# Patient Record
Sex: FEMALE | Race: WHITE
[De-identification: names, ages, dates, MRNs, and addresses within clinical notes are randomized per-mention and may not be internally consistent; named-entity substitution may affect disease eponyms.]

---

## 2018-10-27 ENCOUNTER — HOSPITAL ENCOUNTER (EMERGENCY)
Dept: HOSPITAL 25 - ED | Age: 83
LOS: 1 days | Discharge: HOME | End: 2018-10-28
Payer: MEDICARE

## 2018-10-27 DIAGNOSIS — R53.1: ICD-10-CM
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
## 2018-10-27 DIAGNOSIS — S70.02XA: Primary | ICD-10-CM
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
## 2018-10-27 LAB
BASOPHILS # BLD: 0 10^3/UL (ref 0–0.2)
HCT VFR BLD AUTO: 26 % (ref 35–47)
HGB BLD-MCNC: 8.9 G/DL (ref 12–16)
MCH RBC QN AUTO: 37 PG (ref 27–31)
MCHC RBC AUTO-ENTMCNC: 35 G/DL (ref 31–36)
MCV RBC AUTO: 106 FL (ref 80–97)
NEUTROPHILS # BLD AUTO: 1.8 10^3/UL (ref 1.5–7.7)
NEUTROPHILS # BLD: 1.5 10^3/UL (ref 1.5–7.7)
PLATELET # BLD AUTO: 104 10^3/UL (ref 150–450)
RBC # BLD AUTO: 2.4 10^6/UL (ref 4–5.4)
WBC # BLD AUTO: 2.1 10^3/UL (ref 3.5–10.8)

## 2018-10-27 PROCEDURE — 87086 URINE CULTURE/COLONY COUNT: CPT

## 2018-10-27 PROCEDURE — 81015 MICROSCOPIC EXAM OF URINE: CPT

## 2018-10-27 PROCEDURE — 81003 URINALYSIS AUTO W/O SCOPE: CPT

## 2018-10-27 PROCEDURE — 72192 CT PELVIS W/O DYE: CPT

## 2018-10-27 PROCEDURE — 99283 EMERGENCY DEPT VISIT LOW MDM: CPT

## 2018-10-27 PROCEDURE — 36415 COLL VENOUS BLD VENIPUNCTURE: CPT

## 2018-10-27 PROCEDURE — 93005 ELECTROCARDIOGRAM TRACING: CPT

## 2018-10-27 PROCEDURE — 85025 COMPLETE CBC W/AUTO DIFF WBC: CPT

## 2018-10-27 PROCEDURE — 80053 COMPREHEN METABOLIC PANEL: CPT

## 2018-10-27 NOTE — XMS REPORT
Milly Saenz

 Created on:2018



Patient:Milly Saenz

Sex:Female

:1933

External Reference #:[id number]





Demographics







 Address  [address] [place] [address]

 

 Home Phone  [phone]

 

 Email Address  [email or web address] [email or web address]

 

 Preferred Language  English

 

 Marital Status  Not  Or 

 

 Gnosticist Affiliation  Unknown

 

 Race  White

 

 Ethnic Group  Not  Or 









Author







 Organization  [facility]

 

 Address  [address]



   [place] [address]

 

 Phone  [phone]









Support







 Name  Relationship  Address  Phone

 

 Vaughn Lizama/Laney  Unavailable  Unavailable  [phone]

 

 Erlin Valdesjoceline  Unavailable  Unavailable  [phone]









Care Team Providers







 Name  Role  Phone

 

 Grey Coello MD  Primary Care Physician  Unavailable









Payers







 Type  Date  Identification Numbers  Payment Provider  Subscriber

 

 Medicare Primary    Policy Number: [id number]  Medicare  Milly Saenz









 PayID: [id number]  [address]









 [place] [address]









 [employer]  Effective: 10/01/1998  Policy Number:  Medicare  Milly Saenz



     [id number]    









 Expires: 09/10/2018  PayID: [id number]  [address]









 [place] [address]









 [employer] [employer]  Effective:  Policy Number:  [facility]  Milly Mckeonmaria del rosario



   2008  [id number]  Healthcare  









 PayID: [id number]  [address]









 [place] [address]







Problems







 Date  Description  Provider  Status

 

 Onset: 2018  Localized, primary osteoarthritis  Rajinder Medina MD  Active

 

 Onset: 2018  Closed fracture of phalanx of foot  Rajinder Medina MD  
Active

 

 Onset: 2018  Closed fracture of metatarsal bone  Rajinder Medina MD  
Active







Social History







 Description

 

 No Information Available







Allergies, Adverse Reactions, Alerts







 Date  Description  Reaction  Status  Severity  Comments

 

 2018  NKDA    active    







Medications







 Medication  Date  Status  Form  Strength  Qnty  SIG  Indications  Ordering



                 Provider

 

 Simvastatin  00/  Active  Tablets  20mg    1 by mouth    Unknown



   0000          every day    

 

 Raloxifene HCL  00/00/  Active  Tablets  60mg    1 by mouth    Unknown



   0000          every day    

 

 Levothyroxine  0000/  Active  Tablets  112mcg    1 by mouth    Unknown



 Sodium  0000          every day    

 

 Calcitonin  00/  Active  Solution  200Unit/Ac    one spray    Unknown



 ([place])  0000      t    daily in    



             alternate    



             nostrils    

 

 Omeprazole  /  Active  Capsules  20mg    1 by mouth    Unknown



   0000    DR      every day    

 

 Imbruvica  /  Active  Capsules  420mg    1 tab po    Unknown



   0000          daily    







Vital Signs







 Date  Vital  Result  Comment

 

 10/23/2018  Height  61 inches  5'1"









 Weight  129.00 lb  

 

 Heart Rate  88 /min  

 

 Respiratory Rate  18 /min  

 

 BMI (Body Mass Index)  24.4 kg/m2  









 2018  Height  61 inches  5'1"









 Weight  129.00 lb  

 

 Heart Rate  84 /min  

 

 Respiratory Rate  16 /min  

 

 Body Temperature  97.7 F  

 

 Pain Level  8  

 

 BMI (Body Mass Index)  24.4 kg/m2  







Results







 Description

 

 No Information







Procedures







 Date  CPT Code  Description  Status  Comment

 

 2013  58756  Rad Exam; Fingers  Completed  

 

 2013  84490  Rad Exam; Wrist, Comp, Min 3 Views  Completed  RT

 

 2013  06414  Xray Knee 3 Views  Completed  

 

 2013  67044  Rad Exam; Knee, Ap&L  Completed  







Encounters







 Type  Date  Location  Provider  CPT E/M  Dx

 

 Office Visit  2018  [facility]  Rajinder Medina MD  [id number]  
S92.325A



   10:00a  Of [facility]      









 S92.335A

 

 S92.345A

 

 S92.415A

 

 M17.12

 

 W19.xxxA

 

 S92.355A









 Office Visit  2013 10:15a  [facility]  Chyna Dash,  
[address]  [phone]



     Of CGerberMLIZA JOHNSON    









 719.44









 Office Visit  2013 10:30a  [facility]  Elver Mehta M.D.  
[address]  [phone]



     C.MGerberAGerber      

 

 Office Visit  2013 10:30a  [facility]  Chyna  [address]  
715.94



     CBARNEY Dash M.D.    

 

 [facility] Visit  2009 12:30a  [facility]  [address]  [phone]



     [facility]  M.D.    









 995.91

 

 284.1







Plan of Care

Future Appointment(s):2018 10:15 am - Rajinder Medina MD at [facility].10/23/2018 - Rajinder Medina, MDS92.335A Nondisp fx of third 
metatarsal bone, left foot, initNew Xrays:Foot Left 3+ VWSFollow up:Follow Up: 
  6 fnolvJ27.325A Nondisp fx of second metatarsal bone, left foot, initNew Xrays
:Foot Left 3+ VWSS92.345A Nondisp fx of fourth metatarsal bone, left foot, init

## 2018-10-27 NOTE — ED
Lower Extremity





- HPI Summary


HPI Summary: 





This patient is a 85 year old F BIBA to [facility] accompanied by her friend with a 

chief complaint of left hip pain after a mechanical fall prior to arrival. 

Patient states she typically uses a walker at baseline, and while entering her 

apartment she used the wall as support instead and fell on her left hip. She 

states that afterward she was able to get up and enter her apartment, but her 

neighbor had called EMS. Patient denies recent fever, cold, cough, difficult 

breathing, changes in appetite and bowel or urinary habits. Patient reports 

gradually worsening leg weakness. Left hip pain is worsened with movement. PMHx 

of chronic lymphatic leukemia.





- History of Current Complaint


Stated Complaint: HIP PAIN


Time Seen by Provider: 10/27/18 20:40


Hx Obtained From: Patient


Mechanism Of Injury: Fall From A Standing Position


Onset of Pain: Immediate, Prior to Arrival


Onset/Duration: Still Present


Timing: Constant


Location: Is Discrete @ - left hip


Aggravating Factor(s): Movement


Able to Bear Weight: Yes





- Allergies/Home Medications


Allergies/Adverse Reactions: 


 Allergies











Allergy/AdvReac Type Severity Reaction Status Date / Time


 


No Known Allergies Allergy   Verified 09/10/18 10:29











Home Medications: 


 Home Medications





Omeprazole 20 mg PO DAILY 10/27/18 [History Confirmed 10/27/18]











PMH/Surg Hx/FS Hx/Imm Hx


Endocrine/Hematology History: Reports: Hx Thyroid Disease


EENT History: 


   Denies: Hx Deafness





- Cancer History


Cancer Type, Location and Year: leukemia


Hx Chemotherapy: Yes - 3 YEARS AGO, LEUKEMIA


Hx Radiation Therapy: Yes - 9 YEARS AGO,  LEUKEMIA





- Family History


Known Family History: 


   Negative: Respiratory Disease, Seizure Disorder





- Social History


Alcohol Use: None


Substance Use Type: Reports: None


Smoking Status (MU): Never Smoked Tobacco





Review of Systems


Negative: Fever


Respiratory: Negative


Gastrointestinal: Negative


Positive: no symptoms reported


Positive: Myalgia - left hip


Neurological: Negative


All Other Systems Reviewed And Are Negative: Yes





Physical Exam





- Summary


Physical Exam Summary: 





Appearance: Well-appearing, Well-nourished, lying in bed comfortably


Skin: Warm, dry, no obvious rash


Eyes: sclera anicteric, no conjunctival pallor


ENT: mucous membranes moist, pharynx appears normal


Neck: Supple, nontender


Respiratory: Clear to auscultation, no signs of respiratory distress


Cardiovascular: Normal S1, S2. No murmurs. Normal distal pulses in tibial and 

radial bilaterally.


Abdomen: Soft, nontender, normal active bowel sounds present


Musculoskeletal: Normal, Strength/ROM Intact, no pain with rotation and flexion 

of the hip, no pain with longitudinal pressure at the foot


Neurological: A&Ox3, awake and alert, mentation is normal, speech is fluent and 

appropriate


Psychiatric: affect is normal, does not appear anxious or depressed





Triage Information Reviewed: Yes


Vital Signs Reviewed: Yes





Diagnostics





- Laboratory


Result Diagrams: 


 10/27/18 20:48





 10/27/18 20:48


Lab Statement: Any lab studies that have been ordered have been reviewed, and 

results considered in the medical decision making process.





- Radiology


  ** L Hip XR


Radiology Interpretation Completed By: ED Physician - Negative.





- CT


  ** Pelvis CT


CT Interpretation Completed By: Radiologist - Diffuse demineralization of the 

bones. Degenerative changes. Mildly depressed  fracture of the right superior 

pubic ramus seen on the sagittal and coronal  images cannot be seen on the 

axial source images. Findings may be secondary to  reconstruction artifact. 

Bowing of the left inferior pubic ramus on the left.  No obvious fracture or 

dislocation is seen. Degenerative changes of the  lumbosacral spine.   ED 

Physician has reviewed this report.





- EKG


  ** 2053


Cardiac Rate: NL - 80 BPM


EKG Rhythm: Sinus Rhythm


Summary of EKG Findings: normal EKG





Re-Evaluation





- Re-Evaluation


  ** 0145


Re-Evaluation Time: 01:45


Comment: Patient is able to bear weight; however, patient is limitedin ability 

to ambulate. Patient wont be able to get into home due to darkness and weather. 

Will medicate for pain and will re-eval in the morning.





Lower Extremity Course/Dx





- Course


Course Of Treatment: 85 year old F BIBA to [facility] accompanied by her friend with 

a chief complaint of left hip pain after a mechanical fall prior to arrival. 

Patient states she typically uses a walker at baseline, and while entering her 

apartment she used the wall as support instead and fell on her left hip. She 

states that afterward she was able to get up and enter her apartment. Bloodwork 

is consistent with reported leukemia. Patient is given Motrin, Flexeril, and 

Tylenol. Left hip XR is negative for fracture. Pelvis CT reveals degenerative 

changes, but no acute fracture. Patient is able to bear weight; however, 

patient is limited in ability to ambulate. Patient wont be able to get into 

home due to darkness and weather. after sleeping through the night patient is 

able to ambulate well and is discharged home.





Discharge





- Sign-Out/Discharge


Documenting (check all that apply): Patient Departure - discharge





- Discharge Plan


Condition: Improved


Disposition: HOME


Patient Education Materials:  Hip Contusion (ED)


Referrals: 


Grey Coello MD [Primary Care Provider] - 3 Days (if not better)


Additional Instructions: 


Take tylenol, 2 extra strenth tabs, 4 times daily for pain. You can supplement 

that with OTC motrin, 400 mg three times daily if needed. Ice over the painful 

areas can also help with the pain.





- Attestation Statements


Document Initiated by Scribe: Yes


Documenting Scribe: Madelin Herrera 


Provider For Whom Scribe is Documenting (Include Credential): Venu Gilmore MD


Scribe Attestation: 


Madelin CASTILLO , scribed for Venu Gilmore MD on 10/28/18 at 0649.

## 2018-10-28 VITALS — SYSTOLIC BLOOD PRESSURE: 120 MMHG | DIASTOLIC BLOOD PRESSURE: 74 MMHG

## 2018-10-28 LAB — WBC UR QL AUTO: (no result)

## 2018-10-28 NOTE — RAD
INDICATION:  Left hip injury.



COMPARISON:  There are no relevant prior studies available for comparison.



TECHNIQUE: An AP view of the pelvis and frontal and lateral views of the left hip were

obtained.



FINDINGS: The bones appear osteopenic. The hips appear dysplastic. No fracture is seen.  



There is mild bilateral osteoarthritic change in the hips.



IMPRESSION: LIMITED STUDY, NO EVIDENCE FOR FRACTURE, IF THE PATIENT'S SYMPTOMS PERSIST

RECOMMEND FOLLOW-UP IMAGING.



R0

## 2018-10-28 NOTE — RAD
EXAM: 

 CT Pelvis Without Intravenous Contrast, Skeletal 



EXAM DATE/TIME: 

 10/27/2018 11:55 PM 



CLINICAL HISTORY: 

 85 years old, female; Injury or trauma; Fall; Initial encounter; Abrasion; 

Left; Groin; Additional info: Fall, left hip pain, neg plain film, R/O hip FX 



TECHNIQUE: 

 Axial computed tomography images of the pelvis without intravenous contrast. 

Exam focused on the skeletal structures. 

 All CT scans at this facility use at least one of these dose optimization 

techniques: automated exposure control; mA and/or kV adjustment per patient 

size (includes targeted exams where dose is matched to clinical indication); or 

iterative reconstruction. 

 Coronal and sagittal reformatted images were created and reviewed. 



COMPARISON: 

 No relevant prior studies available. 



FINDINGS: 

 Vasculature:  Atherosclerosis. 

 Bones/joints:  Diffuse demineralization of the bones. Degenerative changes. 

Mildly depressed fracture of the right superior pubic ramus seen on the 

sagittal and coronal images cannot be seen on the axial source images. Findings 

may be secondary to reconstruction artifact. Bowing of the left inferior pubic 

ramus on the left. No obvious fracture or dislocation is seen. Degenerative 

changes of the lumbosacral spine. 

 Soft tissues: Unremarkable. 



IMPRESSION: 

Diffuse demineralization of the bones. Degenerative changes. Mildly depressed 

fracture of the right superior pubic ramus seen on the sagittal and coronal 

images cannot be seen on the axial source images. Findings may be secondary to 

reconstruction artifact. Bowing of the left inferior pubic ramus on the left. 

No obvious fracture or dislocation is seen. Degenerative changes of the 

lumbosacral spine. 



To contact [facility] with a general question: [facility] - [phone]

For direct physician to physician contact: Physician Hotline - [phone]

[facility] ([facility] Facility ID #[id number])

## 2018-11-01 ENCOUNTER — HOSPITAL ENCOUNTER (INPATIENT)
Dept: HOSPITAL 25 - ED | Age: 83
LOS: 5 days | Discharge: NURSING, REHAB OR LONG-TERM CARE | DRG: 809 | End: 2018-11-06
Attending: INTERNAL MEDICINE | Admitting: INTERNAL MEDICINE
Payer: MEDICARE

## 2018-11-01 DIAGNOSIS — Z86.12: ICD-10-CM

## 2018-11-01 DIAGNOSIS — M81.0: ICD-10-CM

## 2018-11-01 DIAGNOSIS — Z79.899: ICD-10-CM

## 2018-11-01 DIAGNOSIS — E78.5: ICD-10-CM

## 2018-11-01 DIAGNOSIS — D61.810: Primary | ICD-10-CM

## 2018-11-01 DIAGNOSIS — E87.1: ICD-10-CM

## 2018-11-01 DIAGNOSIS — C91.10: ICD-10-CM

## 2018-11-01 DIAGNOSIS — E03.9: ICD-10-CM

## 2018-11-01 DIAGNOSIS — E44.0: ICD-10-CM

## 2018-11-01 DIAGNOSIS — G83.11: ICD-10-CM

## 2018-11-01 DIAGNOSIS — R53.1: ICD-10-CM

## 2018-11-01 LAB
BASOPHILS # BLD AUTO: 0 10^3/UL (ref 0–0.2)
EOSINOPHIL # BLD AUTO: 0.1 10^3/UL (ref 0–0.6)
HCT VFR BLD AUTO: 21 % (ref 35–47)
HGB BLD-MCNC: 7.5 G/DL (ref 12–16)
LYMPHOCYTES # BLD AUTO: 0.4 10^3/UL (ref 1–4.8)
MCH RBC QN AUTO: 38 PG (ref 27–31)
MCHC RBC AUTO-ENTMCNC: 36 G/DL (ref 31–36)
MCV RBC AUTO: 106 FL (ref 80–97)
MONOCYTES # BLD AUTO: 0.1 10^3/UL (ref 0–0.8)
NEUTROPHILS # BLD AUTO: 2.2 10^3/UL (ref 1.5–7.7)
NRBC # BLD AUTO: 0 10^3/UL
NRBC BLD QL AUTO: 0.4
PLATELET # BLD AUTO: 56 10^3/UL (ref 150–450)
RBC # BLD AUTO: 2 10^6/UL (ref 4–5.4)
WBC # BLD AUTO: 2.7 10^3/UL (ref 3.5–10.8)

## 2018-11-01 PROCEDURE — 84550 ASSAY OF BLOOD/URIC ACID: CPT

## 2018-11-01 PROCEDURE — 81015 MICROSCOPIC EXAM OF URINE: CPT

## 2018-11-01 PROCEDURE — 99223 1ST HOSP IP/OBS HIGH 75: CPT

## 2018-11-01 PROCEDURE — 83735 ASSAY OF MAGNESIUM: CPT

## 2018-11-01 PROCEDURE — 86850 RBC ANTIBODY SCREEN: CPT

## 2018-11-01 PROCEDURE — 86901 BLOOD TYPING SEROLOGIC RH(D): CPT

## 2018-11-01 PROCEDURE — 82570 ASSAY OF URINE CREATININE: CPT

## 2018-11-01 PROCEDURE — 83935 ASSAY OF URINE OSMOLALITY: CPT

## 2018-11-01 PROCEDURE — 83550 IRON BINDING TEST: CPT

## 2018-11-01 PROCEDURE — 84484 ASSAY OF TROPONIN QUANT: CPT

## 2018-11-01 PROCEDURE — 83605 ASSAY OF LACTIC ACID: CPT

## 2018-11-01 PROCEDURE — 30233N1 TRANSFUSION OF NONAUTOLOGOUS RED BLOOD CELLS INTO PERIPHERAL VEIN, PERCUTANEOUS APPROACH: ICD-10-PCS | Performed by: INTERNAL MEDICINE

## 2018-11-01 PROCEDURE — 84443 ASSAY THYROID STIM HORMONE: CPT

## 2018-11-01 PROCEDURE — 36415 COLL VENOUS BLD VENIPUNCTURE: CPT

## 2018-11-01 PROCEDURE — P9040 RBC LEUKOREDUCED IRRADIATED: HCPCS

## 2018-11-01 PROCEDURE — 82272 OCCULT BLD FECES 1-3 TESTS: CPT

## 2018-11-01 PROCEDURE — 93005 ELECTROCARDIOGRAM TRACING: CPT

## 2018-11-01 PROCEDURE — 80053 COMPREHEN METABOLIC PANEL: CPT

## 2018-11-01 PROCEDURE — 82728 ASSAY OF FERRITIN: CPT

## 2018-11-01 PROCEDURE — 86922 COMPATIBILITY TEST ANTIGLOB: CPT

## 2018-11-01 PROCEDURE — 80048 BASIC METABOLIC PNL TOTAL CA: CPT

## 2018-11-01 PROCEDURE — 81003 URINALYSIS AUTO W/O SCOPE: CPT

## 2018-11-01 PROCEDURE — 84300 ASSAY OF URINE SODIUM: CPT

## 2018-11-01 PROCEDURE — 86900 BLOOD TYPING SEROLOGIC ABO: CPT

## 2018-11-01 PROCEDURE — 83540 ASSAY OF IRON: CPT

## 2018-11-01 PROCEDURE — 99232 SBSQ HOSP IP/OBS MODERATE 35: CPT

## 2018-11-01 PROCEDURE — 85025 COMPLETE CBC W/AUTO DIFF WBC: CPT

## 2018-11-01 PROCEDURE — 71046 X-RAY EXAM CHEST 2 VIEWS: CPT

## 2018-11-01 PROCEDURE — 83880 ASSAY OF NATRIURETIC PEPTIDE: CPT

## 2018-11-01 PROCEDURE — 84134 ASSAY OF PREALBUMIN: CPT

## 2018-11-01 PROCEDURE — 87086 URINE CULTURE/COLONY COUNT: CPT

## 2018-11-01 PROCEDURE — 99283 EMERGENCY DEPT VISIT LOW MDM: CPT

## 2018-11-01 RX ADMIN — SODIUM CHLORIDE SCH MLS/HR: 900 IRRIGANT IRRIGATION at 23:26

## 2018-11-01 RX ADMIN — SENNOSIDES SCH TAB: 8.6 TABLET, FILM COATED ORAL at 23:29

## 2018-11-01 NOTE — ED
Complex/Multi-Sys Presentation





- HPI Summary


HPI Summary: 


This patient is a 85 year old F brought in by ambulance to ED with a chief 

complaint of weakness. The CC is described as worsened gradually over the past 

couple of years but especially since recent fall on 10/27/2018 where she 

injured her L leg. She needs her walker in order to move around. The patient 

rates the pain 0/10 in severity. Symptoms aggravated by nothing. Symptoms 

alleviated by nothing. Patient denies dizziness/light-headedness, CP, SOB, and 

melena. She is not taking any blood thinners but she is on chemo pills for 

chronic lymphocytic leukemia.





- History Of Current Complaint


Chief Complaint: EDWeakness


Time Seen by Provider: 11/01/18 17:59


Onset/Duration: Gradual Onset, Lasting Weeks - worsened gradually over the past 

couple of years but especially since recent fall on 10/27/2018 where she 

injured her L leg, Still Present


Timing: Constant


Severity Currently: None


Aggravating Factor(s): nothing


Alleviating Factor(s): nothing


Associated Signs And Symptoms: Positive: Weakness.  Negative: Dizziness, SOB, 

Chest Pain, Melena





- Allergies/Home Medications


Allergies/Adverse Reactions: 


 Allergies











Allergy/AdvReac Type Severity Reaction Status Date / Time


 


No Known Allergies Allergy   Verified 09/10/18 10:29














PMH/Surg Hx/FS Hx/Imm Hx


Endocrine/Hematology History: Reports: Hx Thyroid Disease


Sensory History: 


   Denies: Hx Deafness


Neurological History: Reports: Other Neuro Impairments/Disorders - polio





- Cancer History


Cancer Type, Location and Year: leukemia


Hx Chemotherapy: Yes - 3 YEARS AGO, LEUKEMIA


Hx Radiation Therapy: Yes - 9 YEARS AGO,  LEUKEMIA


Infectious Disease History: Unable to Obtain/Confirm


Infectious Disease History: 


   Denies: Traveled Outside the [place] in Last 30 Days





- Family History


Known Family History: 


   Negative: Respiratory Disease, Seizure Disorder





- Social History


Alcohol Use: None


Substance Use Type: Reports: None


Smoking Status (MU): Never Smoked Tobacco





Review of Systems


Negative: Fever, Chills


Negative: Erythema


Negative: Sore Throat


Negative: Chest Pain


Negative: Shortness Of Breath, Cough


Positive: Other - denies melena.  Negative: Abdominal Pain, Vomiting, Nausea


Negative: dysuria, hematuria


Negative: Myalgia, Edema


Negative: Rash


Neurological: Other - denies dizziness/light-headedness


Positive: Weakness


All Other Systems Reviewed And Are Negative: Yes





Physical Exam





- Summary


Physical Exam Summary: 


Constitutional: Well-developed, Well-nourished, Alert. (-) Distressed


Skin: Warm, Dry


HENT: Normocephalic; Atraumatic


Eyes: Conjunctiva normal


Neck: Musculoskeletal ROM normal neck. (-) JVD, (-) Stridor, (-) Tracheal 

deviation


Cardio: Rhythm regular, rate normal, Heart sounds normal; Intact distal pulses; 

The pedal pulses are 2+ and symmetric. Radial pulses are 2+ and symmetric. (-) 

Murmur


Pulmonary/Chest wall: Effort normal. (-) Respiratory distress, (-) Wheezes, (-) 

Rales


Abd: Soft, (-) epigastric tenderness, (-) Distension, (-) Guarding, (-) Rebound


Musculoskeletal: (-) Edema


Lymph: (-) Cervical adenopathy


Neuro: Alert, Oriented x3      


Psych: Mood and affect Normal


Rectal exam chaperoned by nurse aide Jailene: No sasha blood, empty vault.


Triage Information Reviewed: Yes


Vital Signs On Initial Exam: 


 Initial Vitals











Temp Pulse Resp BP Pulse Ox


 


 97.8 F   70   16   121/54   98 


 


 11/01/18 16:26  11/01/18 16:26  11/01/18 16:26  11/01/18 16:26  11/01/18 16:26











Vital Signs Reviewed: Yes





Diagnostics





- Vital Signs


 Vital Signs











  Temp Pulse Resp BP Pulse Ox


 


 11/01/18 16:26  97.8 F  70  16  121/54  98














- Laboratory


Lab Results: 


 Lab Results











  11/01/18 11/01/18 11/01/18 Range/Units





  16:48 16:48 16:48 


 


WBC  2.7 L    (3.5-10.8)  10^3/ul


 


RBC  2.00 L    (4.00-5.40)  10^6/ul


 


Hgb  7.5 L    (12.0-16.0)  g/dl


 


Hct  21 L    (35-47)  %


 


MCV  106 H    (80-97)  fL


 


MCH  38 H    (27-31)  pg


 


MCHC  36    (31-36)  g/dl


 


RDW  20 H    (10.5-15)  %


 


Plt Count  56 L    (150-450)  10^3/ul


 


MPV  7.7    (7.4-10.4)  um3


 


Neut % (Auto)  80.1    (38-83)  %


 


Lymph % (Auto)  15.1 L    (25-47)  %


 


Mono % (Auto)  2.2    (0-7)  %


 


Eos % (Auto)  1.9    (0-6)  %


 


Baso % (Auto)  0.7    (0-2)  %


 


Absolute Neuts (auto)  2.2    (1.5-7.7)  10^3/ul


 


Absolute Lymphs (auto)  0.4 L    (1.0-4.8)  10^3/ul


 


Absolute Monos (auto)  0.1    (0-0.8)  10^3/ul


 


Absolute Eos (auto)  0.1    (0-0.6)  10^3/ul


 


Absolute Basos (auto)  0    (0-0.2)  10^3/ul


 


Absolute Nucleated RBC  0    10^3/ul


 


Nucleated RBC %  0.4    


 


Sodium   127 L   (135-145)  mmol/L


 


Potassium   4.3   (3.5-5.0)  mmol/L


 


Chloride   98 L   (101-111)  mmol/L


 


Carbon Dioxide   21 L   (22-32)  mmol/L


 


Anion Gap   8   (2-11)  mmol/L


 


BUN   15   (6-24)  mg/dL


 


Creatinine   0.46 L   (0.51-0.95)  mg/dL


 


Est GFR ( Amer)   156.2   (>60)  


 


Est GFR (Non-Af Amer)   129.1   (>60)  


 


BUN/Creatinine Ratio   32.6 H   (8-20)  


 


Glucose   90   ()  mg/dL


 


Lactic Acid    0.7  (0.5-2.0)  mmol/L


 


Calcium   8.5 L   (8.6-10.3)  mg/dL


 


Magnesium   1.9   (1.9-2.7)  mg/dL


 


Total Bilirubin   1.00   (0.2-1.0)  mg/dL


 


AST   24   (13-39)  U/L


 


ALT   18   (7-52)  U/L


 


Alkaline Phosphatase   23 L   ()  U/L


 


Troponin I   0.00   (<0.04)  ng/mL


 


Total Protein   5.4 L   (6.4-8.9)  g/dL


 


Albumin   3.2   (3.2-5.2)  g/dL


 


Globulin   2.2   (2-4)  g/dL


 


Albumin/Globulin Ratio   1.5   (1-3)  


 


TSH   9.57 H   (0.34-5.60)  mcIU/mL











Result Diagrams: 


 11/04/18 07:18





 11/04/18 07:18


Lab Statement: Any lab studies that have been ordered have been reviewed, and 

results considered in the medical decision making process.





- EKG


  ** 1812


Cardiac Rate: NL - 65 BPM


EKG Rhythm: Sinus Rhythm


Summary of EKG Findings: No STEMI





Complex Multi-Symp Course/Dx


Assessment/Plan: This patient is a 85 year old F brought in by ambulance to ED 

with a chief complaint of weakness. EKG done at 1812 reveals NSR at 65 BPM and 

no STEMI. Consulted Dr. Shaw about the patient and he accepts for admission. 

Patient understands and agrees.





- Diagnoses


Provider Diagnoses: 


 Symptomatic anemia








- Physician Notifications


Discussed Care Of Patient With: Ken Shaw


Time Discussed With Above Provider: 18:36


Instructed by Provider To: Admit As Inpatient





Discharge





- Sign-Out/Discharge


Documenting (check all that apply): Patient Departure - admit





- Discharge Plan


Condition: Stable


Disposition: ADMITTED TO [facility]





- Billing Disposition and Condition


Condition: STABLE


Disposition: Admitted to [facility]





- Attestation Statements


Document Initiated by Scribe: Yes


Documenting Scribe: Ayush Granado


Provider For Whom Scribe is Documenting (Include Credential): Helio Giang MD


Scribe Attestation: 


Ayush CASTILLO, scribed for Helio Giang MD on 11/05/18 at 1132. 


Scribe Documentation Reviewed: Yes


Provider Attestation: 


The documentation as recorded by the scribeAyush accurately reflects the 

service I personally performed and the decisions made by me, Helio Giang MD

## 2018-11-02 LAB
BASOPHILS # BLD AUTO: 0 10^3/UL (ref 0–0.2)
EOSINOPHIL # BLD AUTO: 0.1 10^3/UL (ref 0–0.6)
HCT VFR BLD AUTO: 27 % (ref 35–47)
HGB BLD-MCNC: 9.8 G/DL (ref 12–16)
LYMPHOCYTES # BLD AUTO: 0.3 10^3/UL (ref 1–4.8)
MCH RBC QN AUTO: 36 PG (ref 27–31)
MCHC RBC AUTO-ENTMCNC: 36 G/DL (ref 31–36)
MCV RBC AUTO: 100 FL (ref 80–97)
MONOCYTES # BLD AUTO: 0 10^3/UL (ref 0–0.8)
NEUTROPHILS # BLD AUTO: 2 10^3/UL (ref 1.5–7.7)
NRBC # BLD AUTO: 0 10^3/UL
NRBC BLD QL AUTO: 0
PLATELET # BLD AUTO: 47 10^3/UL (ref 150–450)
RBC # BLD AUTO: 2.74 10^6/UL (ref 4–5.4)
RBC UR QL AUTO: (no result)
WBC # BLD AUTO: 2.5 10^3/UL (ref 3.5–10.8)
WBC UR QL AUTO: (no result)

## 2018-11-02 RX ADMIN — OMEPRAZOLE SCH MG: 20 CAPSULE, DELAYED RELEASE ORAL at 05:30

## 2018-11-02 RX ADMIN — SENNOSIDES SCH TAB: 8.6 TABLET, FILM COATED ORAL at 21:39

## 2018-11-02 RX ADMIN — SODIUM CHLORIDE SCH MLS/HR: 900 IRRIGANT IRRIGATION at 21:39

## 2018-11-02 RX ADMIN — LEVOTHYROXINE SODIUM SCH MCG: 112 TABLET ORAL at 05:30

## 2018-11-02 RX ADMIN — SENNOSIDES SCH TAB: 8.6 TABLET, FILM COATED ORAL at 10:13

## 2018-11-02 RX ADMIN — SODIUM CHLORIDE SCH MLS/HR: 900 IRRIGANT IRRIGATION at 11:30

## 2018-11-02 RX ADMIN — ATORVASTATIN CALCIUM SCH MG: 10 TABLET, FILM COATED ORAL at 10:13

## 2018-11-02 NOTE — HP
HISTORY AND PHYSICAL:

 

DATE OF ADMISSION:  11/01/18

 

CHIEF COMPLAINT:  Feeling week.

 

IDENTIFICATION:  An 85-year-old female with 17q positive CLL currently on 
therapy with Imbruvica.

 

HISTORY OF PRESENT ILLNESS:  Ms. Saenz started Imbruvica in May.  Therapy had 
been held for pancytopenia.  At the end of October, she had a white count of 
800 on 10/23/18 and 1.3 on 10/26/18.  She came into the emergency room on 10/27/
18 after a fall and concerned about a broken hip.  At that time her white count 
was 2.1 and Imbruvica was restarted.  Platelet count was at the baseline around 
100 and on 10/23/18 was 109 and 104 on 10/27/18.  Her hemoglobin had a baseline 
of 11 to 12 until April of this year when it was 8.6, and has gone as low as 
7.6 in the past, was 9 on 10/23/18.  She started retaking the Imbruvica.  After 
starting taking the Imbruvica on the 27th she noticed increasing fatigue.  She 
has been weaker and weaker to the point that she has had more difficulty 
walking around.  This morning she was much much worse.  She was in a chair and 
was not able to get up and decided to come to the emergency room.  CBC on 
presentation included a hemoglobin of 7.5 as noted, down from 8.9 four days ago
; white count 2.7; platelets of 56,000.  She has a sodium of 127, chloride 98, 
carbon-dioxide 21, and creatinine 0.46.

 

She has no fevers or chills.  She says she is eating well and has not had 
diarrhea. She is not in pain.  She is alert and oriented x3 in the emergency 
room, but quite immobile.

 

PAST MEDICAL HISTORY:

1.  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  She was treated with 
radiation at presentation.  Remained disease free until 2009 when she received 
chemotherapy with 2 medicines from July through November.  She tolerated that 
reasonably well and did not need therapy again until 2018.  At that time she 
presented with progressive pancytopenia.  Repeat bone marrow biopsy showed 
infiltration of CLL, 17q positive.  She was started on Imbruvica.  Generally 
tolerated Imbruvica well.

2.  Hypothyroidism.

3.  GERD.

4.  Osteoporosis.

5.  High cholesterol.

6.  Polio and in a brace on the right leg since 1937.

 

PAST SURGICAL HISTORY:  She had fibrocystic breast had biopsies years ago.

 

MEDICATIONS:

1.  Calcitonin nasal spray 200 units daily.

2.  Imbruvica 420 mg daily.

3.  Synthroid 112 mcg daily.

4.  Omeprazole 20 mg daily.

5.  Compazine 10 mg 4 times a day p.r.n.

6.  Raloxifene 20 mg daily.

7.  Simvastatin 20 mg daily.

 

ALLERGIES:  None.

 

FAMILY HISTORY:  Noncontributory.

 

SOCIAL HISTORY:  She lives alone in an apartment.  She has a Yazidism family and 
friends as well as neighbors that she is close to.  She has no living family. 
[place]  She reports she has a proxy, but 
cannot recall who it is.

 

REVIEW OF SYSTEMS:  General:  Profound weakness, difficulty with any movement.  
No fevers, chills, or night sweats.  HEENT:  Dry mouth.  No active dental 
disease. Lungs:  Negative.  Cardiac:  Negative.  GI:  Negative.  :  She says 
she urinates fine.  Musculoskeletal:  She has a brace from the [employer] and she is 
very weak.  She fell last week and may have hurt her hip, but evaluation is 
negative in the emergency room.  She had a fall on 10/08/18 and a broken 
metatarsal.  But she said she can walk without pain on that foot.  Neurologic:  
Negative. Skin:  Negative.

 

                               PHYSICAL EXAMINATION

 

VITAL SIGNS:  Temperature 97.8, /54, pulse 70, respirations 16.

 

HEENT:  Mucosa dry, she has no active dental disease.  No oral lesions. 
Conjunctivae pale.

 

LUNGS:  Clear to auscultation bilaterally.

 

HEART:  Regular, rate, and rhythm.  S1 and S2.  Slight systolic murmur, no 
gallops.

 

ABDOMEN:  Nontender.  Nondistended.  I cannot feel a spleen.  Good bowel sounds.

 

EXTREMITIES:  She has the brace on the right leg.  Trace edema bilaterally. 
Difficult to examine her in the chair.

 

NEUROLOGIC:  She is grossly nonfocal.  Could turn in the wheelchair and has 
Strength 5/5 in the arms, but could not move the legs in the position she was 
in.

 

 LABORATORY DATA:  As noted in the HPI.

 

IMAGING:  Pelvis CT from 10/27/18 did not show a fracture.

 

ASSESSMENT AND PLAN:  An 85-year-old female with chronic lymphocytic leukemia, 
17q positive, on Imbruvica, who presents with progressive weakness, found to 
have a hemoglobin of 7.5 one week after restarting therapy.  Suspect that she 
has pancytopenia secondary to her chemotherapy.  Cytopenias could be driven 
primarily by progressive chronic lymphocytic leukemia during the time off 
therapy.  Differential could also include blood loss; the normal BUN and 
creatinine are reassuring.  She is hyponatremic which is suspect is from 
dehydration, there could be syndrome of inappropriate secretion of antidiuretic 
hormone.

 

1.  We will admit her.  Plan physical therapy evaluation of  ADLs.  Transfuse 2 
units of packed red blood cells.

2.  Hyponatremia.  Normal saline at 100 cc/hour and follow.  We will check UA 
including urine osmolality and check a chest x-ray for occult pulmonary disease.

3.  Follow white count, follow platelets.  No indication for platelet 
transfusion at this time.

4.  We will continue her levothyroxine and simvastatin.  We are going to hold 
her other medications until we get clarification.

5.  We will give her Lovenox for DVT prophylaxis as long as her platelet count 
remains above 50,000.

6.  She is a full code at this time, will need to clarify health care proxy 
will have office records in the morning.

7.  Hold her Imbruvica. 

 

[id number]/CPS #: [id number]

BRITNEY

## 2018-11-02 NOTE — PN
Progress Note





- Progress Note


Date of Service: 11/02/18


SOAP: 


Subjective:


[Admitted last night with severe weakness to the point that she was unable to 

reposition herself in bed.  She was pancytopenic at admission with Hgb 7.5.  

Transfused 2U PRBCs last night.  She reports feeling slightly better, but still 

so weak that she is unable to lift her legs.]








Objective:


[


 Laboratory Results - last 24 hr











  11/01/18 11/01/18 11/01/18





  16:48 16:48 16:48


 


WBC  2.7 L  


 


RBC  2.00 L  


 


Hgb  7.5 L  


 


Hct  21 L  


 


MCV  106 H  


 


MCH  38 H  


 


MCHC  36  


 


RDW  20 H  


 


Plt Count  56 L  


 


MPV  7.7  


 


Neut % (Auto)  80.1  


 


Lymph % (Auto)  15.1 L  


 


Mono % (Auto)  2.2  


 


Eos % (Auto)  1.9  


 


Baso % (Auto)  0.7  


 


Absolute Neuts (auto)  2.2  


 


Absolute Lymphs (auto)  0.4 L  


 


Absolute Monos (auto)  0.1  


 


Absolute Eos (auto)  0.1  


 


Absolute Basos (auto)  0  


 


Absolute Nucleated RBC  0  


 


Nucleated RBC %  0.4  


 


Sodium   127 L 


 


Potassium   4.3 


 


Chloride   98 L 


 


Carbon Dioxide   21 L 


 


Anion Gap   8 


 


BUN   15 


 


Creatinine   0.46 L 


 


Est GFR ( Amer)   156.2 


 


Est GFR (Non-Af Amer)   129.1 


 


BUN/Creatinine Ratio   32.6 H 


 


Glucose   90 


 


Lactic Acid    0.7


 


Calcium   8.5 L 


 


Magnesium   1.9 


 


Iron   67 


 


TIBC   186 L 


 


% Saturation   36 


 


Unsat Iron Binding   < 171 


 


Transferrin   133 L 


 


Ferritin   997.4 H 


 


Total Bilirubin   1.00 


 


AST   24 


 


ALT   18 


 


Alkaline Phosphatase   23 L 


 


Troponin I   0.00 


 


Total Protein   5.4 L 


 


Albumin   3.2 


 


Globulin   2.2 


 


Albumin/Globulin Ratio   1.5 


 


TSH   9.57 H 


 


Urine Color   


 


Urine Appearance   


 


Urine pH   


 


Ur Specific Gravity   


 


Urine Protein   


 


Urine Ketones   


 


Urine Blood   


 


Urine Nitrate   


 


Urine Bilirubin   


 


Urine Urobilinogen   


 


Ur Leukocyte Esterase   


 


Urine WBC (Auto)   


 


Urine RBC (Auto)   


 


Ur Squamous Epith Cells   


 


Ur Transition Epith Cell   


 


Urine Bacteria   


 


Urine Glucose   


 


Blood Type   


 


Antibody Screen   


 


Crossmatch   














  11/01/18 11/02/18 11/02/18





  16:48 02:44 06:47


 


WBC    2.5 L


 


RBC    2.74 L


 


Hgb    9.8 L


 


Hct    27 L


 


MCV    100 H


 


MCH    36 H


 


MCHC    36


 


RDW    20 H


 


Plt Count    47 L D


 


MPV    8.0


 


Neut % (Auto)    82.1


 


Lymph % (Auto)    13.2 L


 


Mono % (Auto)    1.6


 


Eos % (Auto)    2.2


 


Baso % (Auto)    0.9


 


Absolute Neuts (auto)    2.0


 


Absolute Lymphs (auto)    0.3 L


 


Absolute Monos (auto)    0


 


Absolute Eos (auto)    0.1


 


Absolute Basos (auto)    0


 


Absolute Nucleated RBC    0


 


Nucleated RBC %    0


 


Sodium   


 


Potassium   


 


Chloride   


 


Carbon Dioxide   


 


Anion Gap   


 


BUN   


 


Creatinine   


 


Est GFR ( Amer)   


 


Est GFR (Non-Af Amer)   


 


BUN/Creatinine Ratio   


 


Glucose   


 


Lactic Acid   


 


Calcium   


 


Magnesium   


 


Iron   


 


TIBC   


 


% Saturation   


 


Unsat Iron Binding   


 


Transferrin   


 


Ferritin   


 


Total Bilirubin   


 


AST   


 


ALT   


 


Alkaline Phosphatase   


 


Troponin I   


 


Total Protein   


 


Albumin   


 


Globulin   


 


Albumin/Globulin Ratio   


 


TSH   


 


Urine Color   Yellow 


 


Urine Appearance   Clear 


 


Urine pH   6.0 


 


Ur Specific Gravity   1.008 L 


 


Urine Protein   Negative 


 


Urine Ketones   Negative 


 


Urine Blood   1+ A 


 


Urine Nitrate   Negative 


 


Urine Bilirubin   Negative 


 


Urine Urobilinogen   Negative 


 


Ur Leukocyte Esterase   Trace A 


 


Urine WBC (Auto)   Trace(0-5/hpf) 


 


Urine RBC (Auto)   Trace(0-2/hpf) 


 


Ur Squamous Epith Cells   Present A 


 


Ur Transition Epith Cell   Present A 


 


Urine Bacteria   Absent 


 


Urine Glucose   Negative 


 


Blood Type  B Positive  


 


Antibody Screen  Negative  


 


Crossmatch  See Detail  














  11/02/18





  06:47


 


WBC 


 


RBC 


 


Hgb 


 


Hct 


 


MCV 


 


MCH 


 


MCHC 


 


RDW 


 


Plt Count 


 


MPV 


 


Neut % (Auto) 


 


Lymph % (Auto) 


 


Mono % (Auto) 


 


Eos % (Auto) 


 


Baso % (Auto) 


 


Absolute Neuts (auto) 


 


Absolute Lymphs (auto) 


 


Absolute Monos (auto) 


 


Absolute Eos (auto) 


 


Absolute Basos (auto) 


 


Absolute Nucleated RBC 


 


Nucleated RBC % 


 


Sodium  129 L


 


Potassium  4.2


 


Chloride  104


 


Carbon Dioxide  20 L


 


Anion Gap  5


 


BUN  12


 


Creatinine  0.43 L


 


Est GFR ( Amer)  168.9


 


Est GFR (Non-Af Amer)  139.6


 


BUN/Creatinine Ratio  27.9 H


 


Glucose  88


 


Lactic Acid 


 


Calcium  8.0 L


 


Magnesium 


 


Iron 


 


TIBC 


 


% Saturation 


 


Unsat Iron Binding 


 


Transferrin 


 


Ferritin 


 


Total Bilirubin  1.20 H


 


AST  22


 


ALT  18


 


Alkaline Phosphatase  24 L


 


Troponin I 


 


Total Protein  5.0 L


 


Albumin  3.0 L


 


Globulin  2.0


 


Albumin/Globulin Ratio  1.5


 


TSH 


 


Urine Color 


 


Urine Appearance 


 


Urine pH 


 


Ur Specific Gravity 


 


Urine Protein 


 


Urine Ketones 


 


Urine Blood 


 


Urine Nitrate 


 


Urine Bilirubin 


 


Urine Urobilinogen 


 


Ur Leukocyte Esterase 


 


Urine WBC (Auto) 


 


Urine RBC (Auto) 


 


Ur Squamous Epith Cells 


 


Ur Transition Epith Cell 


 


Urine Bacteria 


 


Urine Glucose 


 


Blood Type 


 


Antibody Screen 


 


Crossmatch 

















Acetaminophen (Tylenol Tab*)  650 mg PO Q4H PRN


   PRN Reason: FEVER/PAIN


Atorvastatin Calcium (Lipitor*)  10 mg PO DAILY Pending sale to [facility]


Enoxaparin Sodium (Lovenox(*))  30 mg SUBCUT Q24H Pending sale to [facility]


   Last Admin: 11/01/18 23:29 Dose:  30 mg


Sodium Chloride (Ns 0.9% 1000 Ml*)  1,000 mls @ 100 mls/hr IV PER RATE Pending sale to [facility]


   Last Admin: 11/01/18 23:26 Dose:  100 mls/hr


Levothyroxine Sodium (Synthroid Tab*)  112 mcg PO 0600 Pending sale to [facility]


   Last Admin: 11/02/18 05:30 Dose:  112 mcg


Omeprazole (Prilosec Cap*)  20 mg PO 0600 Pending sale to [facility]


   Last Admin: 11/02/18 05:30 Dose:  20 mg


Oxycodone/Acetaminophen (Percocet 5/325 Tab*)  1 tab PO Q4H PRN


   PRN Reason: Pain


Senna (Senokot Tab*)  1 tab PO BID Pending sale to [facility]


   Last Admin: 11/01/18 23:29 Dose:  1 tab








Vital Signs:











Temp Pulse Resp BP Pulse Ox


 


 97.4 F   66   16   121/61   96 


 


 11/02/18 07:26  11/02/18 07:26  11/02/18 07:26  11/02/18 07:26  11/02/18 07:26








Exam:


Gen: very pleasant 82 yo female in NAD


HEENT: dry MM


CV: RRR, no m/r/g


Resp: CTA, no w/c/r


Abd: soft, nonTTP


Ext: trace LE edema


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 

bilaterally]








Assessment:


[Very pleasant 84 yo female with CLL and h/o polio with chronic RLE paralysis 

who presented with profound weakness and anemia.  








Plan:


[1. Weakness 


- likely multifactorial with some chronic component, but now so severe that she 

was unable to even reposition in bed


- request evaluation by PT/OT


2. Pancytopenia


- transfused 2U PRBCs, cont to monitor


3. CLL


- hold Imbruvica due to cytopenias and worsening weakness


4. H/o polio with RLE weakness





Dispo: request PT/OT eval, patient would benefit from ABBIE and may require long 

term nursing home placement


]

## 2018-11-02 NOTE — RAD
Indication: Hyponatremia.



2 views of the chest are reviewed. Comparison is made with previous exam dated December 21, 2009. No mediastinal shift is noted. Heart is of normal size and configuration. No

pleural fluid, pneumonia or pneumothorax is noted. Chronic pleural parenchymal changes are

noted in the left lung base.



IMPRESSION: Chronic pleural parenchymal changes in the left lung base without definite

pneumonia.

## 2018-11-03 RX ADMIN — SODIUM CHLORIDE SCH MLS/HR: 900 IRRIGANT IRRIGATION at 20:56

## 2018-11-03 RX ADMIN — SODIUM CHLORIDE SCH MLS/HR: 900 IRRIGANT IRRIGATION at 07:58

## 2018-11-03 RX ADMIN — SENNOSIDES SCH TAB: 8.6 TABLET, FILM COATED ORAL at 07:58

## 2018-11-03 RX ADMIN — SENNOSIDES SCH TAB: 8.6 TABLET, FILM COATED ORAL at 19:58

## 2018-11-03 RX ADMIN — ATORVASTATIN CALCIUM SCH MG: 10 TABLET, FILM COATED ORAL at 07:58

## 2018-11-03 RX ADMIN — LEVOTHYROXINE SODIUM SCH MCG: 112 TABLET ORAL at 05:45

## 2018-11-03 RX ADMIN — OMEPRAZOLE SCH MG: 20 CAPSULE, DELAYED RELEASE ORAL at 05:45

## 2018-11-03 NOTE — PN
Progress Note





- Progress Note


Date of Service: 11/03/18


SOAP: 


Subjective:


does not feel any improvement in weakness after transfusion.  reports she has 

been gradually worsening over several months, however over the last couple of 

months has not been able to go grocery shopping or do basic self care at times.

  





Objective:





 Vital Signs











Temp Pulse Resp BP Pulse Ox


 


 98.4 F   63   18   121/48   93 


 


 11/03/18 03:22  11/03/18 03:22  11/03/18 03:22  11/03/18 03:22  11/03/18 03:22











perr eomi


op dry


poor dentition


CTA bl


s1 s2 nl


soft nt +bs


Ext: trace LE edema, chronic stasis changes on left LE with erythema but no 

warmth


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 

bilaterally





 Laboratory Results - last 24 hr











  11/02/18 11/02/18





  06:47 06:47


 


WBC  2.5 L 


 


RBC  2.74 L 


 


Hgb  9.8 L 


 


Hct  27 L 


 


MCV  100 H 


 


MCH  36 H 


 


MCHC  36 


 


RDW  20 H 


 


Plt Count  47 L D 


 


MPV  8.0 


 


Neut % (Auto)  82.1 


 


Lymph % (Auto)  13.2 L 


 


Mono % (Auto)  1.6 


 


Eos % (Auto)  2.2 


 


Baso % (Auto)  0.9 


 


Absolute Neuts (auto)  2.0 


 


Absolute Lymphs (auto)  0.3 L 


 


Absolute Monos (auto)  0 


 


Absolute Eos (auto)  0.1 


 


Absolute Basos (auto)  0 


 


Absolute Nucleated RBC  0 


 


Nucleated RBC %  0 


 


Sodium   129 L


 


Potassium   4.2


 


Chloride   104


 


Carbon Dioxide   20 L


 


Anion Gap   5


 


BUN   12


 


Creatinine   0.43 L


 


Est GFR ( Amer)   168.9


 


Est GFR (Non-Af Amer)   139.6


 


BUN/Creatinine Ratio   27.9 H


 


Glucose   88


 


Calcium   8.0 L


 


Total Bilirubin   1.20 H


 


AST   22


 


ALT   18


 


Alkaline Phosphatase   24 L


 


Total Protein   5.0 L


 


Albumin   3.0 L


 


Globulin   2.0


 


Albumin/Globulin Ratio   1.5























Assessment:


86 yo female with CLL and h/o polio with chronic RLE paralysis who presented 

with profound weakness and anemia.  








Plan:


1. Weakness 


- likely multifactorial with some chronic component, but now so severe that she 

was unable to even reposition in bed.  Suspect exacerbation by ibrutinib for CLL


- cont PT/OT


- will need STR vs. potentially permanent placement





2. Pancytopenia, related to CLL


- transfused 2U PRBCs, cont to monitor





3. CLL


- hold Imbruvica due to cytopenias and worsening weakness





4. hyponatremia:


acute on chronic.  suspect hypovolemic hyponatremia right now.  continue IVFs





5. hypothyroidism:


increase synthroid, recheck in 6-8 weeks





6. low protein: suspect protein calorie malnutrition from poor access to 

nutrition. will check prealbumin today





7. no DVT prophylaxis with this degree of thrombocytopenia





full code


]

## 2018-11-04 LAB
BASOPHILS # BLD AUTO: 0 10^3/UL (ref 0–0.2)
EOSINOPHIL # BLD AUTO: 0.1 10^3/UL (ref 0–0.6)
HCT VFR BLD AUTO: 28 % (ref 35–47)
HGB BLD-MCNC: 9.6 G/DL (ref 12–16)
LYMPHOCYTES # BLD AUTO: 0.5 10^3/UL (ref 1–4.8)
MCH RBC QN AUTO: 36 PG (ref 27–31)
MCHC RBC AUTO-ENTMCNC: 35 G/DL (ref 31–36)
MCV RBC AUTO: 102 FL (ref 80–97)
MONOCYTES # BLD AUTO: 0 10^3/UL (ref 0–0.8)
NEUTROPHILS # BLD AUTO: 2.5 10^3/UL (ref 1.5–7.7)
NRBC # BLD AUTO: 0 10^3/UL
NRBC BLD QL AUTO: 0.1
PLATELET # BLD AUTO: 42 10^3/UL (ref 150–450)
RBC # BLD AUTO: 2.71 10^6/UL (ref 4–5.4)
URATE SERPL-MCNC: 3.1 MG/DL (ref 2.3–6.6)
WBC # BLD AUTO: 3 10^3/UL (ref 3.5–10.8)

## 2018-11-04 RX ADMIN — SENNOSIDES SCH TAB: 8.6 TABLET, FILM COATED ORAL at 21:09

## 2018-11-04 RX ADMIN — ATORVASTATIN CALCIUM SCH MG: 10 TABLET, FILM COATED ORAL at 09:45

## 2018-11-04 RX ADMIN — LEVOTHYROXINE SODIUM SCH MCG: 137 TABLET ORAL at 05:51

## 2018-11-04 RX ADMIN — SENNOSIDES SCH TAB: 8.6 TABLET, FILM COATED ORAL at 09:45

## 2018-11-04 RX ADMIN — SODIUM CHLORIDE SCH MLS/HR: 900 IRRIGANT IRRIGATION at 05:52

## 2018-11-04 RX ADMIN — OMEPRAZOLE SCH MG: 20 CAPSULE, DELAYED RELEASE ORAL at 05:47

## 2018-11-04 NOTE — PN
Subjective


Date of Service: 11/04/18


Interval History: 








Pt in good spirits


Na 127 from 129


Hgb stable 9.6 from 9.8


Hoping to go to [facility]. 


Denies chest pain, abdominal pain, shortness of breath, F/C/N/V, cough. 


Frequently urinating. No dysuria. 














Objective


Active Medications: 








Acetaminophen (Tylenol Tab*)  650 mg PO Q4H PRN


   PRN Reason: FEVER/PAIN


   Last Admin: 11/02/18 21:39 Dose:  325 mg


Atorvastatin Calcium (Lipitor*)  10 mg PO DAILY [facility]


   Last Admin: 11/04/18 09:45 Dose:  10 mg


Levothyroxine Sodium (Synthroid Tab*)  137 mcg PO DAILY@0600 [facility]


   Last Admin: 11/04/18 05:51 Dose:  137 mcg


Omeprazole (Prilosec Cap*)  20 mg PO 0600 [facility]


   Last Admin: 11/04/18 05:47 Dose:  20 mg


Oxycodone/Acetaminophen (Percocet 5/325 Tab*)  1 tab PO Q4H PRN


   PRN Reason: Pain


Senna (Senokot Tab*)  1 tab PO BID [facility]


   Last Admin: 11/04/18 09:45 Dose:  1 tab








 Vital Signs - 8 hr











  11/04/18 11/04/18 11/04/18





  08:00 08:07 12:08


 


Temperature  97.9 F 97.6 F


 


Pulse Rate  67 66


 


Respiratory 18 18 16





Rate   


 


Blood Pressure  131/64 126/61





(mmHg)   


 


O2 Sat by Pulse  93 96





Oximetry   











Oxygen Devices in Use Now: None


Appearance: NAD


Eyes: No Scleral Icterus, PERRLA


Ears/Nose/Mouth/Throat: NL Teeth, Lips, Gums, Mucous Membranes Moist


Neck: NL Appearance and Movements; NL JVP, Trachea Midline


Respiratory: Symmetrical Chest Expansion and Respiratory Effort, Clear to 

Auscultation


Cardiovascular: NL Sounds; No Murmurs; No JVD, RRR


Abdominal: NL Sounds; No Tenderness; No Distention, No Hepatosplenomegaly


Extremities: - - 2+ edema, b/l LE


Skin: - - faint erythema to left calf (but less than yesterday per report), 

slight warmth. 


Neurological: Alert and Oriented x 3, - - 3/5 LLE, flacid RLE.  4+L/5-R. 

bicep pain limited on R but at least 4, L 4+. 


Nutrition: Taking PO's


Result Diagrams: 


 11/04/18 07:18





 11/04/18 07:18


Additional Lab and Data: 








 Laboratory Results - last 24 hr











  11/04/18 11/04/18 11/04/18





  07:18 07:18 07:18


 


WBC  3.0 L  


 


RBC  2.71 L  


 


Hgb  9.6 L  


 


Hct  28 L  


 


MCV  102 H  


 


MCH  36 H  


 


MCHC  35  


 


RDW  21 H  


 


Plt Count  42 L  


 


MPV  7.8  


 


Neut % (Auto)  81.2  


 


Lymph % (Auto)  15.1 L  


 


Mono % (Auto)  1.3  


 


Eos % (Auto)  2.0  


 


Baso % (Auto)  0.4  


 


Absolute Neuts (auto)  2.5  


 


Absolute Lymphs (auto)  0.5 L  


 


Absolute Monos (auto)  0  


 


Absolute Eos (auto)  0.1  


 


Absolute Basos (auto)  0  


 


Absolute Nucleated RBC  0  


 


Nucleated RBC %  0.1  


 


Sodium   127 L 


 


Potassium   3.7 


 


Chloride   103 


 


Carbon Dioxide   20 L 


 


Anion Gap   4 


 


BUN   10 


 


Creatinine   0.37 L 


 


Est GFR ( Amer)   200.8 


 


Est GFR (Non-Af Amer)   166.0 


 


BUN/Creatinine Ratio   27.0 H 


 


Glucose   90 


 


Uric Acid   3.1 


 


Calcium   7.7 L 


 


B-Natriuretic Peptide    101 H


 


Urine Osmolality   


 


Ur Random Creatinine   


 


Ur Random Sodium   














  11/04/18 11/04/18





  12:00 12:00


 


WBC  


 


RBC  


 


Hgb  


 


Hct  


 


MCV  


 


MCH  


 


MCHC  


 


RDW  


 


Plt Count  


 


MPV  


 


Neut % (Auto)  


 


Lymph % (Auto)  


 


Mono % (Auto)  


 


Eos % (Auto)  


 


Baso % (Auto)  


 


Absolute Neuts (auto)  


 


Absolute Lymphs (auto)  


 


Absolute Monos (auto)  


 


Absolute Eos (auto)  


 


Absolute Basos (auto)  


 


Absolute Nucleated RBC  


 


Nucleated RBC %  


 


Sodium  


 


Potassium  


 


Chloride  


 


Carbon Dioxide  


 


Anion Gap  


 


BUN  


 


Creatinine  


 


Est GFR ( Amer)  


 


Est GFR (Non-Af Amer)  


 


BUN/Creatinine Ratio  


 


Glucose  


 


Uric Acid  


 


Calcium  


 


B-Natriuretic Peptide  


 


Urine Osmolality  426 


 


Ur Random Creatinine   34.91


 


Ur Random Sodium   120

















Microbiology and Other Data: 








 Microbiology





11/02/18 02:44   Urine   Urine Culture - Final


11/01/18 18:10   Stool   Stool Occult Blood (MASON) - Final














Assess/Plan/Problems-Billing


Assessment: 





84 yo female PMH CLL(2003, recurred 2009) on ibrutinib though off 2/2 

pancytopenia, hypothyroidism, HLD, osteopenia, childhood polio with residual 

flaccid paralysis RLE p/w weakness and symptomatic anemia Hgb 7.5 likely from 

ibrutinib. PLT 40s.  s/p 2u pRBC. Likely needing rehab placement as lives alone 

and no family in area. 














- Patient Problems


(1) Pancytopenia due to antineoplastic chemotherapy


Current Visit: Yes   Status: Acute   Code(s): D61.810 - ANTINEOPLASTIC 

CHEMOTHERAPY INDUCED PANCYTOPENIA; T45.1X5A - ADVERSE EFFECT OF ANTINEOPLASTIC 

AND IMMUNOSUP DRUGS, INIT   SNOMED Code(s): 231300346309082


   Comment: s/p 2u pRBC. ANC 2500. PLT 42 (no DVT ppx). 


Hgb stable 9.6


Holding ibrutinib (for CLL)   





(2) Weakness


Current Visit: Yes   Status: Acute   Code(s): R53.1 - WEAKNESS   SNOMED Code(s)

: 60946664


   Comment: Likely combination of medication side effect, chronic polio weakness

, symptamatic anema, hyponatremia. Potential post polio syndrome as well. 

Continue PT, likely will need SNF if not long term NH placement. Lives alone, 

no family around.    





(3) Hyponatremia


Current Visit: Yes   Status: Acute   Code(s): E87.1 - HYPO-OSMOLALITY AND 

HYPONATREMIA   SNOMED Code(s): 07529084


   Comment: Has gotten 100cc/hr NS for 2 days for presumed hypovolemic 

hyponatremia without improvement. Attests to between 2-3 quarts of water at 

home each day. Peripheral edema present, pt somewhat poor historian in this 

regard but perhaps slightly worse. Noticed slightly increased WOB with talking. 

NS stopped. BNP checked: 101. Uric acid wnl. Ulytes checked: Anali 120, Uoscm 426

, FeNa 1.0%. Hypothyroidism present and may be contributing.    





(4) Hypothyroidism


Current Visit: Yes   Status: Acute   Code(s): E03.9 - HYPOTHYROIDISM, 

UNSPECIFIED   SNOMED Code(s): 02611402


   Comment: continue levothyroxine 137 mcg daily.    





(5) HLD (hyperlipidemia)


Current Visit: Yes   Status: Acute   Code(s): E78.5 - HYPERLIPIDEMIA, 

UNSPECIFIED   SNOMED Code(s): 85774833


   Comment: continue statin.    





(6) History of poliomyelitis


Current Visit: Yes   Status: Acute   Code(s): Z86.12 - PERSONAL HISTORY OF 

POLIOMYELITIS   SNOMED Code(s): 646063047


   





(7) Hx of chronic lymphocytic leukemia


Current Visit: Yes   Status: Acute   Code(s): Z85.6 - PERSONAL HISTORY OF 

LEUKEMIA   SNOMED Code(s): 02177990547534


   Comment: Holding ibrutinib   


Status and Disposition: 





heme one inpatient. medicine covering today. Likely to SNF in next 24-48 hours.

## 2018-11-05 RX ADMIN — LEVOTHYROXINE SODIUM SCH MCG: 137 TABLET ORAL at 06:59

## 2018-11-05 RX ADMIN — SENNOSIDES SCH TAB: 8.6 TABLET, FILM COATED ORAL at 08:59

## 2018-11-05 RX ADMIN — ATORVASTATIN CALCIUM SCH MG: 10 TABLET, FILM COATED ORAL at 08:59

## 2018-11-05 RX ADMIN — OMEPRAZOLE SCH MG: 20 CAPSULE, DELAYED RELEASE ORAL at 06:59

## 2018-11-05 RX ADMIN — SENNOSIDES SCH TAB: 8.6 TABLET, FILM COATED ORAL at 20:10

## 2018-11-05 NOTE — PN
Progress Note





- Progress Note


Date of Service: 11/05/18


SOAP: 


Subjective:


[Continued weakness.  No new complaints.  Working with PT.]








Objective:


[


 Laboratory Results - last 24 hr











  11/04/18 11/04/18 11/04/18





  07:18 07:18 12:00


 


Sodium  127 L  


 


Potassium  3.7  


 


Chloride  103  


 


Carbon Dioxide  20 L  


 


Anion Gap  4  


 


BUN  10  


 


Creatinine  0.37 L  


 


Est GFR ( Amer)  200.8  


 


Est GFR (Non-Af Amer)  166.0  


 


BUN/Creatinine Ratio  27.0 H  


 


Glucose  90  


 


Uric Acid  3.1  


 


Calcium  7.7 L  


 


B-Natriuretic Peptide   101 H 


 


Urine Osmolality    426


 


Ur Random Creatinine   


 


Ur Random Sodium   














  11/04/18





  12:00


 


Sodium 


 


Potassium 


 


Chloride 


 


Carbon Dioxide 


 


Anion Gap 


 


BUN 


 


Creatinine 


 


Est GFR ( Amer) 


 


Est GFR (Non-Af Amer) 


 


BUN/Creatinine Ratio 


 


Glucose 


 


Uric Acid 


 


Calcium 


 


B-Natriuretic Peptide 


 


Urine Osmolality 


 


Ur Random Creatinine  34.91


 


Ur Random Sodium  120

















Acetaminophen (Tylenol Tab*)  650 mg PO Q4H PRN


   PRN Reason: FEVER/PAIN


   Last Admin: 11/02/18 21:39 Dose:  325 mg


Atorvastatin Calcium (Lipitor*)  10 mg PO DAILY [facility]


   Last Admin: 11/05/18 08:59 Dose:  10 mg


Levothyroxine Sodium (Synthroid Tab*)  137 mcg PO DAILY@0600 [facility]


   Last Admin: 11/05/18 06:59 Dose:  137 mcg


Omeprazole (Prilosec Cap*)  20 mg PO 0600 [facility]


   Last Admin: 11/05/18 06:59 Dose:  20 mg


Oxycodone/Acetaminophen (Percocet 5/325 Tab*)  1 tab PO Q4H PRN


   PRN Reason: Pain


Senna (Senokot Tab*)  1 tab PO BID [facility]


   Last Admin: 11/05/18 08:59 Dose:  1 tab








Vital Signs:











Temp Pulse Resp BP Pulse Ox


 


 97.8 F   78   22   109/49   95 


 


 11/05/18 07:59  11/05/18 07:59  11/05/18 08:00  11/05/18 07:59  11/05/18 07:59








Exam:


Gen: Very pleasant 84 yo female in NAD


HEENT: MMM, no thrush


CV: RRR, 3/6 CHER


Resp: few crackles at lung bases


Ext: trace LE edema


Neuro: flaccid paralysis RLE, 2/5 strength LLE, UE 4/5 symmetrically]








[Assessment:


84 yo female with CLL and h/o polio with chronic RLE paralysis who presented 

with profound weakness and anemia.  








Plan:


1. Weakness 


- likely multifactorial with some chronic component, but now so severe that she 

was unable to even reposition in bed.  Suspect exacerbation by ibrutinib for CLL


- cont PT/OT


- will need STR vs. potentially permanent placement





2. Pancytopenia, related to CLL


- transfused 2U PRBCs, cont to monitor


- stable since





3. CLL


- hold Imbruvica indefinitely due to cytopenias and worsening weakness





4. hyponatremia:


- acute on chronic


- now euvolemic


- cont to improve





5. hypothyroidism:


- TSH 9.5, certainly contributing to her weakness


- increase synthroid, recheck in 6-8 weeks





6. low protein: 


- suspect protein calorie malnutrition from poor access to nutrition


- prealbumin 12





7. no DVT prophylaxis with this degree of thrombocytopenia





full code





Dispo: pending ABBIE with likely transition to long term care]

## 2018-11-06 VITALS — SYSTOLIC BLOOD PRESSURE: 112 MMHG | DIASTOLIC BLOOD PRESSURE: 55 MMHG

## 2018-11-06 RX ADMIN — LEVOTHYROXINE SODIUM SCH MCG: 137 TABLET ORAL at 05:49

## 2018-11-06 RX ADMIN — OMEPRAZOLE SCH MG: 20 CAPSULE, DELAYED RELEASE ORAL at 05:49

## 2018-11-06 RX ADMIN — ATORVASTATIN CALCIUM SCH MG: 10 TABLET, FILM COATED ORAL at 09:13

## 2018-11-06 RX ADMIN — SENNOSIDES SCH TAB: 8.6 TABLET, FILM COATED ORAL at 09:13

## 2018-11-06 NOTE — DS
*** AMENDED REPORT NOW INCLUDES DESIGNATED COSIGNER ***



CC:  Dr. Neumann; Dr. Coello.*

 

DISCHARGE SUMMARY:

 

DATE OF ADMISSION:  11/01/18

 

DATE OF DISCHARGE:  11/06/18

 

PRIMARY CARE PROVIDER:  Grey Coello MD

 

PRIMARY ONCOLOGIST:  Jose Manuel Neumann MD

 

ATTENDING PHYSICIAN:  Ken Shaw MD * (DICTATED BY POP PRICE)

 

DISCHARGING PROVIDER:  POP Price

 

PRIMARY DISCHARGE DIAGNOSES:

1.  Profound weakness.

2.  Pancytopenia secondary to chronic lymphocytic leukemia and Imbruvica, 
status post transfusion of 2 units of packed red blood cells.

3.  Hyponatremia.

4.  Hypothyroidism, TSH of 9.5.

5.  Protein-calorie malnutrition, moderate.

 

DISCHARGE MEDICATIONS:

1.  Calcitonin nasal spray 200 units inhale daily.

2.  Omeprazole 20 mg p.o. daily.

3.  Raloxifene 60 mg p.o. daily.

4.  Simvastatin 20 mg p.o. daily.

5.  Levothyroxine 137 mcg p.o. daily.

6.  Percocet 5/325 one tablet p.o. q.4 hours as needed for pain.

7.  Senna 1 tablet p.o. twice daily.

 

MEDICATION CHANGES:

1.  Increased levothyroxine from 112 to 137 mcg.

2.  Start p.r.n. Percocet.

3.  Start Senna.

 

HOSPITAL IMAGING:  Chest x-ray 11/01/18 shows chronic pleural parenchymal 
changes in the left lung base without definitive pneumonia.

 

HOSPITAL COURSE:  This is a very pleasant 85-year-old female with history of 
polio and chronic right lower extremity weakness as well as CLL for which she 
has recently been treated with Imbruvica, who had become progressively weak at 
home and presented to the emergency department by ambulance when her weakness 
progressed to the point that she was unable to reposition herself in bed.  She 
was found to be pancytopenic with a neutrophil counts of 2200 and a total white 
blood cell count of 2700, hemoglobin of 7.5 and platelet count of 56,000.  
Chemistry panel showed mild hyponatremia, otherwise unremarkable.  Also, of note
, TSH was elevated to 9.5. Urine analysis was positive for trace leuke esterase 
and 1+ blood with no growth on subsequent culture.

 

The patient was subsequently transfused 2 units of packed red blood cells and 
her Imbruvica was held during this hospitalization.  She began to work with 
physical therapy with slight improvement in her mobility, but still was unable 
to stand or otherwise ambulate independently.  Recommendation was for transfer 
to subacute rehab with likely transition to a need for long-term skilled 
nursing.

 

DISPOSITION AND FOLLOWUP PLAN:  The patient is being discharged to [facility]. Recommend followup with Dr. Neumann in 2 weeks and appointment has been 
made on her behalf for 11/20/18 at 10:50 a.m.  CBC will be checked at that 
time.  Plan at this time is to hold her Imbruvica indefinitely and further 
treatment of her CLL will depend on her clinical course moving forward.  Of note
, her levothyroxine dose was increased as noted above and she requires a repeat 
TSH in 6 to 8 weeks.

 

____________________________________ POP PRICE

 

[id number]/CPS #: [id number]

BRITNEY

## 2019-01-22 ENCOUNTER — HOSPITAL ENCOUNTER (OUTPATIENT)
Dept: HOSPITAL 25 - ED | Age: 84
Setting detail: OBSERVATION
LOS: 1 days | Discharge: NURSING, REHAB OR LONG-TERM CARE | End: 2019-01-23
Attending: INTERNAL MEDICINE | Admitting: INTERNAL MEDICINE
Payer: MEDICARE

## 2019-01-22 DIAGNOSIS — K21.9: ICD-10-CM

## 2019-01-22 DIAGNOSIS — R05: ICD-10-CM

## 2019-01-22 DIAGNOSIS — D64.9: Primary | ICD-10-CM

## 2019-01-22 DIAGNOSIS — M81.0: ICD-10-CM

## 2019-01-22 DIAGNOSIS — E78.5: ICD-10-CM

## 2019-01-22 DIAGNOSIS — E03.9: ICD-10-CM

## 2019-01-22 DIAGNOSIS — C91.10: ICD-10-CM

## 2019-01-22 DIAGNOSIS — R06.02: ICD-10-CM

## 2019-01-22 LAB
ANION GAP SERPL CALC-SCNC: 8 MMOL/L (ref 2–11)
APTT PPP: 27.4 SECONDS (ref 26–36.3)
BUN SERPL-MCNC: 20 MG/DL (ref 6–24)
BUN/CREAT SERPL: 35.7 (ref 8–20)
CALCIUM SERPL-MCNC: 8.6 MG/DL (ref 8.6–10.3)
CHLORIDE SERPL-SCNC: 102 MMOL/L (ref 101–111)
FERRITIN SERPL IA-MCNC: 1141.8 NG/ML (ref 11–307)
FLUAV RNA SPEC QL NAA+PROBE: NEGATIVE
FLUBV RNA SPEC QL NAA+PROBE: NEGATIVE
GLUCOSE SERPL-MCNC: 109 MG/DL (ref 70–100)
HCO3 SERPL-SCNC: 20 MMOL/L (ref 22–32)
HCT VFR BLD AUTO: 18 % (ref 35–47)
HGB BLD-MCNC: 6.2 G/DL (ref 12–16)
INR PPP/BLD: 1.04 (ref 0.77–1.02)
IRON SERPL-MCNC: 75 UG/DL (ref 50–212)
MCH RBC QN AUTO: 34 PG (ref 27–31)
MCHC RBC AUTO-ENTMCNC: 35 G/DL (ref 31–36)
MCV RBC AUTO: 98 FL (ref 80–97)
PLATELET # BLD AUTO: 33 10^3/UL (ref 150–450)
POTASSIUM SERPL-SCNC: 4.6 MMOL/L (ref 3.5–5)
RBC # BLD AUTO: 1.82 10^6/UL (ref 4–5.4)
SODIUM SERPL-SCNC: 130 MMOL/L (ref 135–145)
TIBC SERPL-MCNC: 207 MCG/DL (ref 250–450)
TRANSFERRIN SERPL-MCNC: 148 MG/DL (ref 203–362)
TROPONIN I SERPL-MCNC: 0 NG/ML (ref ?–0.04)
WBC # BLD AUTO: 2.1 10^3/UL (ref 3.5–10.8)

## 2019-01-22 PROCEDURE — 86850 RBC ANTIBODY SCREEN: CPT

## 2019-01-22 PROCEDURE — 83540 ASSAY OF IRON: CPT

## 2019-01-22 PROCEDURE — 85025 COMPLETE CBC W/AUTO DIFF WBC: CPT

## 2019-01-22 PROCEDURE — 94640 AIRWAY INHALATION TREATMENT: CPT

## 2019-01-22 PROCEDURE — 86901 BLOOD TYPING SEROLOGIC RH(D): CPT

## 2019-01-22 PROCEDURE — 84484 ASSAY OF TROPONIN QUANT: CPT

## 2019-01-22 PROCEDURE — 36415 COLL VENOUS BLD VENIPUNCTURE: CPT

## 2019-01-22 PROCEDURE — 71045 X-RAY EXAM CHEST 1 VIEW: CPT

## 2019-01-22 PROCEDURE — 85610 PROTHROMBIN TIME: CPT

## 2019-01-22 PROCEDURE — 86900 BLOOD TYPING SEROLOGIC ABO: CPT

## 2019-01-22 PROCEDURE — P9040 RBC LEUKOREDUCED IRRADIATED: HCPCS

## 2019-01-22 PROCEDURE — 99219: CPT

## 2019-01-22 PROCEDURE — 82728 ASSAY OF FERRITIN: CPT

## 2019-01-22 PROCEDURE — 99284 EMERGENCY DEPT VISIT MOD MDM: CPT

## 2019-01-22 PROCEDURE — 86140 C-REACTIVE PROTEIN: CPT

## 2019-01-22 PROCEDURE — 93005 ELECTROCARDIOGRAM TRACING: CPT

## 2019-01-22 PROCEDURE — 83880 ASSAY OF NATRIURETIC PEPTIDE: CPT

## 2019-01-22 PROCEDURE — 80048 BASIC METABOLIC PNL TOTAL CA: CPT

## 2019-01-22 PROCEDURE — 87641 MR-STAPH DNA AMP PROBE: CPT

## 2019-01-22 PROCEDURE — 85730 THROMBOPLASTIN TIME PARTIAL: CPT

## 2019-01-22 PROCEDURE — 83550 IRON BINDING TEST: CPT

## 2019-01-22 PROCEDURE — 85027 COMPLETE CBC AUTOMATED: CPT

## 2019-01-22 PROCEDURE — 86922 COMPATIBILITY TEST ANTIGLOB: CPT

## 2019-01-22 PROCEDURE — G0378 HOSPITAL OBSERVATION PER HR: HCPCS

## 2019-01-22 PROCEDURE — 82668 ASSAY OF ERYTHROPOIETIN: CPT

## 2019-01-22 NOTE — ED
Complex/Multi-Sys Presentation





- HPI Summary


HPI Summary: 





This pt is an 84 y/o female presenting to [facility] via EMS from [facility] for 

a blood transfusion. She has hx of chronic lymphocytic leukemia. Pt had blood 

drawn this morning and showed hemoglobin of 5.7. She was called to come to the 

ED for a blood transfusion. She reports cough and runny nose that began 

yesterday. Medical records note pt has been SOB on exertion with occasional 

cough. Denies fever, chills, sweats, chest pain, weakness, black stools.


Her last transfusion was in November 2018.


Denies hx of GI bleed.


Denies tobacco use. Denies hx of asthma or COPD. 


Pt has hx of polio and right leg is paralyzed. 





- History Of Current Complaint


Hx Obtained From: Patient, Medical Records


Onset/Duration: Lasting Days, Still Present


Timing: Days


Severity Currently: Moderate


Location: Negative


Aggravating Factor(s): nothing


Alleviating Factor(s): nothing


Associated Signs And Symptoms: Positive: SOB, Cough.  Negative: Weakness, Chest 

Pain, Fever





- Allergies/Home Medications


Allergies/Adverse Reactions: 


 Allergies











Allergy/AdvReac Type Severity Reaction Status Date / Time


 


No Known Allergies Allergy   Verified 12/20/18 11:49











Home Medications: 


 Home Medications





Calcitonin NASAL(NF) [Fortical(NR)] 1 mg ALT NARE DAILY 01/22/19 [History 

Confirmed 01/22/19]


Levothyroxine TAB* [Synthroid TAB*] 137 mcg PO DAILY 01/22/19 [History 

Confirmed 01/22/19]


Omeprazole CAP (NF) [Prilosec CAP* 20 MG] 20 mg PO DAILY 01/22/19 [History 

Confirmed 01/22/19]


Raloxifene (NF) [Evista(NF)] 60 mg PO DAILY 01/22/19 [History Confirmed 01/22/19

]


Simvastatin TAB(NF) [Zocor(NF)] 20 mg PO DAILY 01/22/19 [History Confirmed 01/22 /19]











PMH/Surg Hx/FS Hx/Imm Hx


Endocrine/Hematology History: Reports: Hx Thyroid Disease


Respiratory History: 


   Denies: Hx Asthma, Hx Chronic Obstructive Pulmonary Disease (COPD)


GI History: Reports: Hx Gastroesophageal Reflux Disease, Other GI Disorders - 

Esophageal "Outpouching"


Musculoskeletal History: Reports: Other Musculoskeletal History - Polio


Sensory History: Reports: Hx Contacts or Glasses, Hx Macular Degeneration


   Denies: Hx Deafness, Hx Hearing Aid


Opthamlomology History: Reports: Hx Contacts or Glasses, Hx Macular Degeneration


Neurological History: Reports: Other Neuro Impairments/Disorders - polio





- Cancer History


Cancer Type, Location and Year: Chronic lymphocytic leukemia


Hx Chemotherapy: Yes - 3 YEARS AGO, LEUKEMIA


Hx Radiation Therapy: Yes - 9 YEARS AGO,  LEUKEMIA





- Family History


Known Family History: 


   Negative: Respiratory Disease, Seizure Disorder





- Social History


Alcohol Use: None


Substance Use Type: Reports: None


Smoking Status (MU): Never Smoked Tobacco


Have You Smoked in the Last Year: No





Review of Systems


Negative: Fever, Chills


Negative: Erythema


Positive: Nasal Discharge - runny nose.  Negative: Sore Throat


Negative: Chest Pain


Positive: Shortness Of Breath, Cough


Negative: Abdominal Pain, Vomiting, Nausea


Negative: dysuria, hematuria


Negative: Myalgia, Edema


Negative: Rash


Neurological: Other - NEG: dizziness


Negative: Weakness


All Other Systems Reviewed And Are Negative: Yes





Physical Exam





- Summary


Physical Exam Summary: 





Constitutional: Well-developed, Well-nourished, Alert. (-) Distressed


Skin: Warm, Dry


HENT: Normocephalic; Atraumatic 


Eyes: Conjunctiva normal


Neck: Musculoskeletal ROM normal neck. (-) JVD, (-) Stridor, (-) Tracheal 

deviation


Cardio: Rhythm regular, rate normal, Heart sounds normal; Intact distal pulses; 

The pedal pulses are 2+ and symmetric. Radial pulses are 2+ and symmetric. (-) 

Murmur


Pulmonary/Chest wall: Effort normal. (-) Respiratory distress, (-) Wheezes, (-) 

Rales


Abd: Soft, (-) Tenderness, (-) Distension, (-) Guarding, (-) Rebound


Musculoskeletal: (-) Edema. Right leg is in a brace. She is slightly weaker on 

the left. 


Lymph: (-) Cervical adenopathy


Neuro: Alert, Oriented x3


Psych: Mood and affect Normal


Triage Information Reviewed: Yes


Vital Signs Reviewed: Yes





Diagnostics





- Laboratory


Result Diagrams: 


 01/22/19 12:57





 01/22/19 12:57


Lab Statement: Any lab studies that have been ordered have been reviewed, and 

results considered in the medical decision making process.





- Radiology


  ** Chest XR


Radiology Interpretation Completed By: Radiologist


Summary of Radiographic Findings: IMPRESSION: Right basilar atelectasis versus 

consolidation. Recommend follow-up until resolution to exclude underlying 

pulmonary parenchymal pathology. Dr. Giang has reviewed this report.





- EKG


  ** 12:47


Cardiac Rate: NL - at 79 bpm


EKG Rhythm: Sinus Rhythm


Summary of EKG Findings: No STEMI.





Complex Multi-Symp Course/Dx


Assessment/Plan: Pt is an 84 y/o female, with hx of chronic lymphocytic leukemia

, who presents to the ED via EMS from [facility] for a blood transfusion. Pt 

had blood drawn this morning and showed hemoglobin of 5.7. She reports cough 

and runny nose that began yesterday. Medical records note pt has been SOB on 

exertion with occasional cough.  Her last transfusion was in November 2018.  

Blood work shows WBC of 2.1, hemoglobin of 6.2, hematocrit of 18, platelet 

count of 33.  Chest XR shows right basilar atelectasis versus consolidation. 

Recommend follow-up until resolution to exclude underlying pulmonary 

parenchymal pathology.  I discussed the case with Dr. Neumann, oncologist, who 

accepted the pt for admission.





- Diagnoses


Provider Diagnoses: 


 Symptomatic anemia, Chronic lymphocytic leukemia








- Physician Notifications


Discussed Care Of Patient With: Jose Manuel Neumann - oncologist


Time Discussed With Above Provider: 13:03


Instructed by Provider To: Admit As Inpatient





Discharge





- Sign-Out/Discharge


Documenting (check all that apply): Patient Departure - Admit to [facility]


All imaging exams completed and their final reports reviewed: Yes





- Discharge Plan


Condition: Stable


Disposition: ADMITTED TO [facility]





- Attestation Statements


Document Initiated by Scribe: Yes


Documenting Scribe: Linnette Mota


Provider For Whom Scribe is Documenting (Include Credential): Helio Giang MD


Scribe Attestation: 


ILinnette, scribed for Helio Giang MD on 01/22/19 at 1611. 


Status of Scribe Document: Ready

## 2019-01-23 VITALS — DIASTOLIC BLOOD PRESSURE: 52 MMHG | SYSTOLIC BLOOD PRESSURE: 107 MMHG

## 2019-01-23 LAB
ANION GAP SERPL CALC-SCNC: 8 MMOL/L (ref 2–11)
BASOPHILS # BLD AUTO: 0 10^3/UL (ref 0–0.2)
BUN SERPL-MCNC: 18 MG/DL (ref 6–24)
BUN/CREAT SERPL: 36.7 (ref 8–20)
CALCIUM SERPL-MCNC: 8.2 MG/DL (ref 8.6–10.3)
CHLORIDE SERPL-SCNC: 105 MMOL/L (ref 101–111)
EOSINOPHIL # BLD AUTO: 0.1 10^3/UL (ref 0–0.6)
GLUCOSE SERPL-MCNC: 86 MG/DL (ref 70–100)
HCO3 SERPL-SCNC: 19 MMOL/L (ref 22–32)
HCT VFR BLD AUTO: 25 % (ref 35–47)
HGB BLD-MCNC: 8.6 G/DL (ref 12–16)
LYMPHOCYTES # BLD AUTO: 0.3 10^3/UL (ref 1–4.8)
MCH RBC QN AUTO: 33 PG (ref 27–31)
MCHC RBC AUTO-ENTMCNC: 35 G/DL (ref 31–36)
MCV RBC AUTO: 92 FL (ref 80–97)
MONOCYTES # BLD AUTO: 0 10^3/UL (ref 0–0.8)
NEUTROPHILS # BLD AUTO: 1.3 10^3/UL (ref 1.5–7.7)
NRBC # BLD AUTO: 0 10^3/UL
NRBC BLD QL AUTO: 0.7
PLATELET # BLD AUTO: 26 10^3/UL (ref 150–450)
POTASSIUM SERPL-SCNC: 4.2 MMOL/L (ref 3.5–5)
RBC # BLD AUTO: 2.65 10^6/UL (ref 4–5.4)
SODIUM SERPL-SCNC: 132 MMOL/L (ref 135–145)
WBC # BLD AUTO: 1.7 10^3/UL (ref 3.5–10.8)

## 2019-01-23 NOTE — DS
- Discharge Summary


Admission Date: 1/22/19


Discharge Date: 1/23/19





Discharge Diagnosis:


1. Panctyopenia: 2/2 CLL, s/p 2 units PRBCs


2. Community Aquired Pneumonia: d/c on Augmentin


3. GERD: resume home meds


4. Hypothyroidism: resume home meds


5. Hx. Polio with right leg paralysis: utilizing brace appropriately, LE edema r

/t retention and mild hypoproteinemia





Discharge Medications:











 Medication  Instructions  Recorded  Confirmed  Type


 


Calcitonin NASAL(NF) [Fortical(NR)] 1 mg ALT NARE DAILY 01/22/19 01/22/19 

History


 


Levothyroxine TAB* [Synthroid 137 137 mcg PO DAILY 01/22/19 01/22/19 History





MCG TAB*]    


 


Omeprazole CAP (NF) [Prilosec CAP* 20 mg PO DAILY 01/22/19 01/22/19 History





20 MG]    


 


Raloxifene (NF) [Evista(NF)] 60 mg PO DAILY 01/22/19 01/22/19 History


 


Simvastatin TAB(NF) [Zocor 20 MG 20 mg PO DAILY 01/22/19 01/22/19 History





(NF)]    


 


Acetaminophen TAB* [Tylenol TAB*] 650 mg PO Q6H PRN  tab 01/23/19  Rx


 


Albuterol HFA INHALER* [Ventolin 2 puff INH Q6H PRN #1 mdi 01/23/19  Rx





HFA Inhaler*]    


 


Amoxicillin/Clavulanate TAB* 875 mg PO BID #20 tab 01/23/19  Rx





[Augmentin *]    








Hospital Course:


Please see admission note for full H&P, however briefly, Mrs. Saenz is well 

known to our service due to her diagnosis of CLL now off therapy due to 

declining performance status being managed with intermittent blood 

transfusions.  Mrs. Saenz presented to the ER yesterday, 1/22, due to severe 

anemia on monthly lab draw and was subsequently admitted for observation in 

order to receive 2 units PRBCs.  During evaluation for admission it was noted 

that she had a cough with SOB and a Chest x-ray revealed mild atelectasis of 

the right lung base.  Due to her mild neutropenia she was started on 

azithromycin with question of mild underlying COPD, however a CRP ordered 

yesterday evening returned elevated at 53 therefore she is being transitioned 

to Augmentin for community acquired pneumonia.  She feels well today and agrees 

to discharge back to skilled nursing at [facility].  She will be discharged with 

thigh high compression for LE edema (retention related to mild hypoproteinemia) 

and will have repeat labs in 1 week and again in 2 weeks with MD follow-up.  

Plan of care was reviewed at length and all questions were answered.  She has 

been encouraged to call our office should progressive SOB, cough, or fevers 

occur.





>40 min spent with >50% face to face counseling

## 2019-02-04 ENCOUNTER — HOSPITAL ENCOUNTER (INPATIENT)
Dept: HOSPITAL 25 - ED | Age: 84
LOS: 11 days | Discharge: NURSING, REHAB OR LONG-TERM CARE | DRG: 291 | End: 2019-02-15
Attending: HOSPITALIST | Admitting: HOSPITALIST
Payer: MEDICARE

## 2019-02-04 DIAGNOSIS — G83.89: ICD-10-CM

## 2019-02-04 DIAGNOSIS — Z86.12: ICD-10-CM

## 2019-02-04 DIAGNOSIS — Z86.69: ICD-10-CM

## 2019-02-04 DIAGNOSIS — Z51.5: ICD-10-CM

## 2019-02-04 DIAGNOSIS — Z66: ICD-10-CM
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
## 2019-02-04 DIAGNOSIS — E87.1: ICD-10-CM

## 2019-02-04 DIAGNOSIS — M81.0: ICD-10-CM

## 2019-02-04 DIAGNOSIS — Z92.21: ICD-10-CM

## 2019-02-04 DIAGNOSIS — I50.33: Primary | ICD-10-CM

## 2019-02-04 DIAGNOSIS — J96.01: ICD-10-CM

## 2019-02-04 DIAGNOSIS — E78.5: ICD-10-CM

## 2019-02-04 DIAGNOSIS — D61.818: ICD-10-CM

## 2019-02-04 DIAGNOSIS — Z87.01: ICD-10-CM

## 2019-02-04 DIAGNOSIS — R00.0: ICD-10-CM

## 2019-02-04 DIAGNOSIS — S31.809A: ICD-10-CM
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
## 2019-02-04 DIAGNOSIS — I08.1: ICD-10-CM

## 2019-02-04 DIAGNOSIS — H61.23: ICD-10-CM

## 2019-02-04 DIAGNOSIS — C91.10: ICD-10-CM

## 2019-02-04 DIAGNOSIS — H35.30: ICD-10-CM

## 2019-02-04 DIAGNOSIS — Z92.3: ICD-10-CM

## 2019-02-04 DIAGNOSIS — J18.9: ICD-10-CM

## 2019-02-04 DIAGNOSIS — E03.9: ICD-10-CM

## 2019-02-04 DIAGNOSIS — E87.70: ICD-10-CM

## 2019-02-04 DIAGNOSIS — K21.9: ICD-10-CM

## 2019-02-04 LAB
ALBUMIN SERPL BCG-MCNC: 3 G/DL (ref 3.2–5.2)
ALBUMIN/GLOB SERPL: 1.3 {RATIO} (ref 1–3)
ALP SERPL-CCNC: 32 U/L (ref 34–104)
ALT SERPL W P-5'-P-CCNC: 17 U/L (ref 7–52)
ANION GAP SERPL CALC-SCNC: 9 MMOL/L (ref 2–11)
APTT PPP: 27.8 SECONDS (ref 26–36.3)
AST SERPL-CCNC: 26 U/L (ref 13–39)
BASOPHILS # BLD AUTO: 0 10^3/UL (ref 0–0.2)
BUN SERPL-MCNC: 12 MG/DL (ref 6–24)
BUN/CREAT SERPL: 30.8 (ref 8–20)
BURR CELLS BLD QL SMEAR: (no result)
CALCIUM SERPL-MCNC: 8 MG/DL (ref 8.6–10.3)
CHLORIDE SERPL-SCNC: 84 MMOL/L (ref 101–111)
EOSINOPHIL # BLD AUTO: 0 10^3/UL (ref 0–0.6)
GLOBULIN SER CALC-MCNC: 2.4 G/DL (ref 2–4)
GLUCOSE SERPL-MCNC: 113 MG/DL (ref 70–100)
HCO3 SERPL-SCNC: 19 MMOL/L (ref 22–32)
HCT VFR BLD AUTO: 22 % (ref 35–47)
HGB BLD-MCNC: 7.8 G/DL (ref 12–16)
INR PPP/BLD: 1.12 (ref 0.77–1.02)
LYMPHOCYTES # BLD AUTO: 0.2 10^3/UL (ref 1–4.8)
MCH RBC QN AUTO: 33 PG (ref 27–31)
MCHC RBC AUTO-ENTMCNC: 35 G/DL (ref 31–36)
MCV RBC AUTO: 93 FL (ref 80–97)
MONOCYTES # BLD AUTO: 0 10^3/UL (ref 0–0.8)
NEUTROPHILS # BLD AUTO: 1.5 10^3/UL (ref 1.5–7.7)
NRBC # BLD AUTO: 0 10^3/UL
NRBC BLD QL AUTO: 0.3
PLATELET # BLD AUTO: 36 10^3/UL (ref 150–450)
POLYCHROMASIA BLD QL SMEAR: (no result)
POTASSIUM SERPL-SCNC: 4.3 MMOL/L (ref 3.5–5)
PROT SERPL-MCNC: 5.4 G/DL (ref 6.4–8.9)
RBC # BLD AUTO: 2.37 10^6/UL (ref 4–5.4)
SODIUM SERPL-SCNC: 112 MMOL/L (ref 135–145)
TROPONIN I SERPL-MCNC: 0.31 NG/ML (ref ?–0.04)
WBC # BLD AUTO: 1.7 10^3/UL (ref 3.5–10.8)

## 2019-02-04 PROCEDURE — 84100 ASSAY OF PHOSPHORUS: CPT

## 2019-02-04 PROCEDURE — 71250 CT THORAX DX C-: CPT

## 2019-02-04 PROCEDURE — 85027 COMPLETE CBC AUTOMATED: CPT

## 2019-02-04 PROCEDURE — 83605 ASSAY OF LACTIC ACID: CPT

## 2019-02-04 PROCEDURE — 82803 BLOOD GASES ANY COMBINATION: CPT

## 2019-02-04 PROCEDURE — 93306 TTE W/DOPPLER COMPLETE: CPT

## 2019-02-04 PROCEDURE — 80053 COMPREHEN METABOLIC PANEL: CPT

## 2019-02-04 PROCEDURE — 87641 MR-STAPH DNA AMP PROBE: CPT

## 2019-02-04 PROCEDURE — 84484 ASSAY OF TROPONIN QUANT: CPT

## 2019-02-04 PROCEDURE — 83880 ASSAY OF NATRIURETIC PEPTIDE: CPT

## 2019-02-04 PROCEDURE — 83930 ASSAY OF BLOOD OSMOLALITY: CPT

## 2019-02-04 PROCEDURE — 84134 ASSAY OF PREALBUMIN: CPT

## 2019-02-04 PROCEDURE — 85610 PROTHROMBIN TIME: CPT

## 2019-02-04 PROCEDURE — 87899 AGENT NOS ASSAY W/OPTIC: CPT

## 2019-02-04 PROCEDURE — 86900 BLOOD TYPING SEROLOGIC ABO: CPT

## 2019-02-04 PROCEDURE — 86922 COMPATIBILITY TEST ANTIGLOB: CPT

## 2019-02-04 PROCEDURE — 80202 ASSAY OF VANCOMYCIN: CPT

## 2019-02-04 PROCEDURE — 86140 C-REACTIVE PROTEIN: CPT

## 2019-02-04 PROCEDURE — 84300 ASSAY OF URINE SODIUM: CPT

## 2019-02-04 PROCEDURE — 36415 COLL VENOUS BLD VENIPUNCTURE: CPT

## 2019-02-04 PROCEDURE — 87205 SMEAR GRAM STAIN: CPT

## 2019-02-04 PROCEDURE — 5A09357 ASSISTANCE WITH RESPIRATORY VENTILATION, LESS THAN 24 CONSECUTIVE HOURS, CONTINUOUS POSITIVE AIRWAY PRESSURE: ICD-10-PCS | Performed by: HOSPITALIST

## 2019-02-04 PROCEDURE — 94640 AIRWAY INHALATION TREATMENT: CPT

## 2019-02-04 PROCEDURE — 87040 BLOOD CULTURE FOR BACTERIA: CPT

## 2019-02-04 PROCEDURE — 85730 THROMBOPLASTIN TIME PARTIAL: CPT

## 2019-02-04 PROCEDURE — P9040 RBC LEUKOREDUCED IRRADIATED: HCPCS

## 2019-02-04 PROCEDURE — 80048 BASIC METABOLIC PNL TOTAL CA: CPT

## 2019-02-04 PROCEDURE — 71045 X-RAY EXAM CHEST 1 VIEW: CPT

## 2019-02-04 PROCEDURE — 84443 ASSAY THYROID STIM HORMONE: CPT

## 2019-02-04 PROCEDURE — 86850 RBC ANTIBODY SCREEN: CPT

## 2019-02-04 PROCEDURE — 83735 ASSAY OF MAGNESIUM: CPT

## 2019-02-04 PROCEDURE — 85025 COMPLETE CBC W/AUTO DIFF WBC: CPT

## 2019-02-04 PROCEDURE — 99285 EMERGENCY DEPT VISIT HI MDM: CPT

## 2019-02-04 PROCEDURE — 93005 ELECTROCARDIOGRAM TRACING: CPT

## 2019-02-04 PROCEDURE — 86901 BLOOD TYPING SEROLOGIC RH(D): CPT

## 2019-02-04 PROCEDURE — 30233N1 TRANSFUSION OF NONAUTOLOGOUS RED BLOOD CELLS INTO PERIPHERAL VEIN, PERCUTANEOUS APPROACH: ICD-10-PCS | Performed by: HOSPITALIST

## 2019-02-04 RX ADMIN — ALBUTEROL SULFATE SCH: 2.5 SOLUTION RESPIRATORY (INHALATION) at 22:42

## 2019-02-04 RX ADMIN — ALBUTEROL SULFATE SCH MG: 2.5 SOLUTION RESPIRATORY (INHALATION) at 22:41

## 2019-02-04 RX ADMIN — ALBUTEROL SULFATE SCH MG: 2.5 SOLUTION RESPIRATORY (INHALATION) at 22:20

## 2019-02-04 NOTE — ED
Shortness of Breath





- HPI Summary


HPI Summary: 





This pt is an 86 y/o female presenting to [facility] via EMS from [facility] for 

respiratory distress. EMS reports the pt had pneumonia 2 weeks ago and was 

treated with antibiotics. EMS administered 10 mg dexamethasone and 1 duoneb 

PTA. Per EMS, blood pressure PTA is 123/72. 


PMHx includes chronic lymphocytic leukemia





HPI is limited due to level 5 caveat - pt in respiratory distress.





- History of Current Complaint


Time Seen by Provider: 02/04/19 22:02


Hx Obtained From: Patient, EMS


Hx From Patient Unobtainable Due To: Other - level 5 caveat - respiratory 

distress


Onset/Duration: Lasting Hours, Still Present


Current Severity: Severe


Dyspnea At: Rest


Aggrevating Factors: Nothing


Alleviating Factors: Nothing


Associated Signs & Symptoms: Wheezing





- Allergy/Home Medications


Allergies/Adverse Reactions: 


 Allergies











Allergy/AdvReac Type Severity Reaction Status Date / Time


 


No Known Allergies Allergy   Verified 12/20/18 11:49














PMH/Surg Hx/FS Hx/Imm Hx


Endocrine/Hematology History: Reports: Hx Blood Transfusions, Hx Bone Marrow 

Disease - CLL, Hx Thyroid Disease


   Denies: Hx Diabetes


Respiratory History: 


   Denies: Hx Asthma, Hx Chronic Obstructive Pulmonary Disease (COPD)


GI History: Reports: Hx Gastroesophageal Reflux Disease, Other GI Disorders - 

Esophageal "Outpouching"


Musculoskeletal History: Reports: Other Musculoskeletal History - Polio


Sensory History: Reports: Hx Contacts or Glasses, Hx Macular Degeneration


   Denies: Hx Deafness, Hx Hearing Aid


Opthamlomology History: Reports: Hx Contacts or Glasses, Hx Macular Degeneration


Neurological History: Reports: Other Neuro Impairments/Disorders - polio





- Cancer History


Cancer Type, Location and Year: Chronic lymphocytic leukemia


Hx Chemotherapy: Yes - 3 YEARS AGO, LEUKEMIA


Hx Radiation Therapy: Yes - 9 YEARS AGO,  LEUKEMIA





- Surgical History


Surgery Procedure, Year, and Place: Right ankle stabilization for foot drop





- Family History


Known Family History: 


   Negative: Respiratory Disease, Seizure Disorder





- Social History


Alcohol Use: None


Substance Use Type: Reports: None


Smoking Status (MU): Never Smoked Tobacco


Have You Smoked in the Last Year: No





Review of Systems





- ROS Summary


Review of Systems Summary: 





ROS is limited due to level 5 caveat - pt in respiratory distress


Negative: Fever


Positive: Shortness Of Breath


All Other Systems Reviewed And Are Negative: No





Physical Exam





- Summary


Physical Exam Summary: 





VITAL SIGNS: Reviewed.


GENERAL: Patient is a well-developed and nourished female who is in respiratory 

distress. 


HEAD AND FACE: No signs of trauma. No ecchymosis, hematomas or skull 

depressions. No sinus tenderness.


EYES: PERRLA, EOMI x 2, No injected conjunctiva, no nystagmus.


EARS: Hearing grossly intact. Ear canals and tympanic membranes are within 

normal limits.


MOUTH: Oropharynx within normal limits.


NECK: Supple, trachea is midline, no adenopathy, no JVD, no carotid bruit, no c-

spine tenderness, neck with full ROM.


CHEST: Symmetric, no tenderness at palpation


LUNGS: Decreased breath sounds bilaterally with rhonchi and wheezing.


CVS: Regular rate and rhythm, S1 and S2 present, no murmurs or gallops 

appreciated.


ABDOMEN: Soft, non-tender. No signs of distention. No rebound no guarding, and 

no masses palpated. Bowel sounds are normal.


EXTREMITIES: FROM in all major joints, no edema, no cyanosis or clubbing.


NEURO: Alert and oriented x 3. No acute neurological deficits. Speech is normal 

and follows commands.


SKIN: Dry and warm


Triage Information Reviewed: Yes


Vital Signs On Initial Exam: 





 Initial Vitals











Temp Pulse Resp BP Pulse Ox


 


 97.7 F   98   43   105/82   96 


 


 02/04/19 22:04  02/04/19 22:04  02/04/19 22:04  02/04/19 22:04  02/04/19 22:04








Vital Signs Reviewed: Yes





Diagnostics





- Laboratory


Result Diagrams: 


 02/04/19 22:39





 02/04/19 22:31


Lab Statement: Any lab studies that have been ordered have been reviewed, and 

results considered in the medical decision making process.





- Radiology


  ** Chest XR


Radiology Interpretation Completed By: ED Physician


Summary of Radiographic Findings: Bilateral pneumonia. Pending official 

radiology report.





- EKG


  ** 2251


Cardiac Rate: NL - at 96 bpm


EKG Rhythm: Sinus Rhythm


Summary of EKG Findings: Nonspecific ST changes.





Course/Dx





- Course


Assessment/Plan: pt is an 86 y/o female, with hx of chronic lymphocytic leukemia

, who presents to the ED via EMS from [facility] for respiratory distress. 

EMS reports the pt had pneumonia 2 weeks ago and was treated with antibiotics. 

EMS administered 10 mg dexamethasone and 1 duoneb PTA. Per EMS, blood pressure 

PTA is 123/72.  Blood work, ABG, EKG, CXR obtained.  Labs show WBC of 1.7, 

hemoglobin of 7.8, hematocrit of 22, platelet count of 36, sodium of 112, 

creatinine of 0.39, troponin of 0.31, CRP of 108, BNP of 381.  Chest XR shows 

bilateral pneumonia.  In the ED course the pt was given albuterol, duoneb, 

Levaquin, magnesium sulfate.  Discussed pt care with Dr. Valdivia, hospitalist, 

who accepted the pt for admission.





- Diagnoses


Provider Diagnoses: 


 CHF (congestive heart failure), Pneumonia, Pancytopenia, Hyponatremia








- Physician Notifications


Discussed Care of Patient With: Nohemy Valdivia - hospitalist


Time Discussed With Above Provider: 23:17


Instructed by Provider To: Admit As Inpatient





- Critical Care Time


Critical Care Time: 30-74 min - 40 minutes





Discharge





- Sign-Out/Discharge


Documenting (check all that apply): Patient Departure - Admit to [facility]


Patient Received Moderate/Deep Sedation with Procedure: No





- Discharge Plan


Condition: Stable


Disposition: ADMITTED TO [facility]


Referrals: 


Grey Coello MD [Primary Care Provider] - 





- Attestation Statements


Document Initiated by Scribe: Yes


Documenting Scribe: Linnette Mota


Provider For Whom Scribe is Documenting (Include Credential): Tray Mota MD


Scribe Attestation: 


Linnette CASTILLO scribed for Tray Mota MD on 02/04/19 at 2331. 


Status of Scribe Document: Ready

## 2019-02-05 LAB
ALBUMIN SERPL BCG-MCNC: 2.8 G/DL (ref 3.2–5.2)
ALBUMIN/GLOB SERPL: 1.3 {RATIO} (ref 1–3)
ALP SERPL-CCNC: 29 U/L (ref 34–104)
ALT SERPL W P-5'-P-CCNC: 15 U/L (ref 7–52)
ANION GAP SERPL CALC-SCNC: 11 MMOL/L (ref 2–11)
ANION GAP SERPL CALC-SCNC: 12 MMOL/L (ref 2–11)
ANION GAP SERPL CALC-SCNC: 14 MMOL/L (ref 2–11)
AST SERPL-CCNC: 22 U/L (ref 13–39)
BASOPHILS # BLD AUTO: 0 10^3/UL (ref 0–0.2)
BUN SERPL-MCNC: 13 MG/DL (ref 6–24)
BUN SERPL-MCNC: 14 MG/DL (ref 6–24)
BUN SERPL-MCNC: 17 MG/DL (ref 6–24)
BUN/CREAT SERPL: 31.7 (ref 8–20)
BUN/CREAT SERPL: 36.8 (ref 8–20)
BUN/CREAT SERPL: 37 (ref 8–20)
CALCIUM SERPL-MCNC: 7.5 MG/DL (ref 8.6–10.3)
CALCIUM SERPL-MCNC: 7.6 MG/DL (ref 8.6–10.3)
CALCIUM SERPL-MCNC: 7.8 MG/DL (ref 8.6–10.3)
CHLORIDE SERPL-SCNC: 84 MMOL/L (ref 101–111)
CHLORIDE SERPL-SCNC: 86 MMOL/L (ref 101–111)
CHLORIDE SERPL-SCNC: 90 MMOL/L (ref 101–111)
EOSINOPHIL # BLD AUTO: 0 10^3/UL (ref 0–0.6)
FLUAV RNA SPEC QL NAA+PROBE: NEGATIVE
FLUBV RNA SPEC QL NAA+PROBE: NEGATIVE
GLOBULIN SER CALC-MCNC: 2.2 G/DL (ref 2–4)
GLUCOSE SERPL-MCNC: 131 MG/DL (ref 70–100)
GLUCOSE SERPL-MCNC: 148 MG/DL (ref 70–100)
GLUCOSE SERPL-MCNC: 194 MG/DL (ref 70–100)
HCO3 SERPL-SCNC: 14 MMOL/L (ref 22–32)
HCO3 SERPL-SCNC: 17 MMOL/L (ref 22–32)
HCO3 SERPL-SCNC: 17 MMOL/L (ref 22–32)
HCT VFR BLD AUTO: 21 % (ref 35–47)
HGB BLD-MCNC: 7.6 G/DL (ref 12–16)
LYMPHOCYTES # BLD AUTO: 0.1 10^3/UL (ref 1–4.8)
MAGNESIUM SERPL-MCNC: 2.2 MG/DL (ref 1.9–2.7)
MCH RBC QN AUTO: 33 PG (ref 27–31)
MCHC RBC AUTO-ENTMCNC: 36 G/DL (ref 31–36)
MCV RBC AUTO: 93 FL (ref 80–97)
MONOCYTES # BLD AUTO: 0 10^3/UL (ref 0–0.8)
NEUTROPHILS # BLD AUTO: 1.3 10^3/UL (ref 1.5–7.7)
NRBC # BLD AUTO: 0 10^3/UL
NRBC BLD QL AUTO: 0.2
PLATELET # BLD AUTO: 32 10^3/UL (ref 150–450)
POTASSIUM SERPL-SCNC: 4.1 MMOL/L (ref 3.5–5)
POTASSIUM SERPL-SCNC: 4.2 MMOL/L (ref 3.5–5)
POTASSIUM SERPL-SCNC: 4.2 MMOL/L (ref 3.5–5)
PREALB SERPL-MCNC: 7 MG/DL (ref 18–38)
PROT SERPL-MCNC: 5 G/DL (ref 6.4–8.9)
RBC # BLD AUTO: 2.3 10^6/UL (ref 4–5.4)
SODIUM SERPL-SCNC: 112 MMOL/L (ref 135–145)
SODIUM SERPL-SCNC: 114 MMOL/L (ref 135–145)
SODIUM SERPL-SCNC: 119 MMOL/L (ref 135–145)
TROPONIN I SERPL-MCNC: 0.23 NG/ML (ref ?–0.04)
TSH SERPL-ACNC: 2.99 MCIU/ML (ref 0.34–5.6)
WBC # BLD AUTO: 1.4 10^3/UL (ref 3.5–10.8)

## 2019-02-05 RX ADMIN — PIPERACILLIN AND TAZOBACTAM SCH MLS/HR: 3; .375 INJECTION, POWDER, LYOPHILIZED, FOR SOLUTION INTRAVENOUS; PARENTERAL at 22:43

## 2019-02-05 RX ADMIN — MORPHINE SULFATE PRN MG: 10 SOLUTION ORAL at 02:53

## 2019-02-05 RX ADMIN — PANTOPRAZOLE SODIUM SCH MG: 40 TABLET, DELAYED RELEASE ORAL at 09:16

## 2019-02-05 RX ADMIN — PIPERACILLIN AND TAZOBACTAM SCH MLS/HR: 3; .375 INJECTION, POWDER, LYOPHILIZED, FOR SOLUTION INTRAVENOUS; PARENTERAL at 15:10

## 2019-02-05 RX ADMIN — MORPHINE SULFATE PRN MG: 10 SOLUTION ORAL at 22:39

## 2019-02-05 RX ADMIN — VANCOMYCIN HYDROCHLORIDE SCH MLS/HR: 1 INJECTION, POWDER, LYOPHILIZED, FOR SOLUTION INTRAVENOUS at 17:39

## 2019-02-05 RX ADMIN — LEVOTHYROXINE SODIUM SCH MCG: 137 TABLET ORAL at 06:19

## 2019-02-05 RX ADMIN — METHYLPREDNISOLONE SODIUM SUCCINATE SCH MG: 40 INJECTION, POWDER, FOR SOLUTION INTRAMUSCULAR; INTRAVENOUS at 22:50

## 2019-02-05 RX ADMIN — PIPERACILLIN AND TAZOBACTAM SCH MLS/HR: 3; .375 INJECTION, POWDER, LYOPHILIZED, FOR SOLUTION INTRAVENOUS; PARENTERAL at 06:19

## 2019-02-05 RX ADMIN — IPRATROPIUM BROMIDE AND ALBUTEROL SULFATE PRN NEB: .5; 3 SOLUTION RESPIRATORY (INHALATION) at 12:16

## 2019-02-05 RX ADMIN — CALCITRIOL SCH: 1 SOLUTION ORAL at 09:21

## 2019-02-05 NOTE — HP
CC:  [facility] *

 

HISTORY AND PHYSICAL:

 

DATE OF ADMISSION:  02/05/19

 

TIME OF EVALUATION:  0100

 

PRIMARY CARE:  [facility].

 

CHIEF COMPLAINT:  Shortness of breath.

 

HISTORY OF PRESENT ILLNESS:  This is an 85-year-old female with a past medical 
history of CLL, not on treatment, with a history of pancytopenia, who was 
recently admitted and discharged on 01/23/19 for what appeared to be community-
acquired pneumonia, has been in [facility] since November for 
worsening decline in her clinical condition, presents to the emergency room 
with shortness of breath and productive cough.  The patient states since her 
discharge from the hospital on 01/23/19, her shortness of breath has gotten 
progressively worse with productive cough.  She denies any chest pain.  She 
states she has been more lightheaded over the past week.  She has had some 
loose stools.  No nausea or vomiting.  Over the past few days, she states she 
has been unable to get out of bed due to how short of breath she is.  She has 
had some urinary frequency.  Denies any burning with urination.  As mentioned, 
she has had about 3 loose stools per day over the past several days.  No 
abdominal pain.  No fevers.  She states she has gained about 3 pounds.  She 
does have lower extremity swelling, more significant on the right side, which 
is not unusual for her.  At [facility], the patient was transferred to the 
emergency room for a low oxygen saturation, 91% on 2 L and difficulty 
breathing.  She had a temperature of 99.5, pulse rate 95, respiratory rate 24, 
blood pressure 138/82.  In the emergency room, the patient had labs and 
imaging.  She was initially started on BiPAP and switched over to a Salter.  
She was given Levaquin, Lasix, DuoNeb, and referred to the hospitalist service 
for further evaluation.  She did get Decadron per RN report via EMS.

 

PAST MEDICAL HISTORY:

1.  History of nasopharyngeal CLL diagnosed in 2003, no longer on treatment due 
to poor performance status.  She has developed chronic pancytopenia as a result 
of treatment with intermittent transfusions required and admitted from 01/22/19 
to 01/23/19 for presumed community-acquired pneumonia.

2.  History of polio with right lower extremity paralysis.

3.  Osteoporosis.

4.  Hypothyroidism.

5.  Hyperlipidemia.

6.  GERD.

 

MEDICATIONS:

1.  Albuterol every 4 hours as needed.

2.  Calcitonin spray each naris daily.

3.  Zinc oxide to the buttock as needed b.i.d.

4.  Ipratropium 4 times a day.

5.  Levaquin 750 mg p.o. daily, started on 02/04/19.

6.  Synthroid 137 mcg p.o. daily.

7.  Omeprazole 20 mg daily.

8.  Raloxifene 60 mg daily.

9.  Simvastatin 20 mg daily.

10.  Tylenol 650 mg every 6 hours as needed.

11.  The patient is also stared on prednisone 40 mg p.o. x1.

 

ALLERGIES:  No known drug allergies.

 

FAMILY HISTORY:  Reviewed and noncontributory.

 

SOCIAL HISTORY:  The patient is residing at [facility] since November 2018.  She 
does ambulate.  No history of smoking, alcohol or illicit drug use.  Her 
healthcare proxy is her friend Kemi Valdes, phone number [phone].  Code status, 
we discussed her code status DNR/DNI.  She hesitated on intubation if her 
breathing did get worse.  We discussed we would readdress this but for now she 
remains a "do not intubate."

 

REVIEW OF SYSTEMS:  A 14-point review of systems as mentioned in the HPI, 
otherwise negative.

 

                               PHYSICAL EXAMINATION

 

GENERAL:  The patient is tachypneic with a significant increase of work of 
breathing with moderate respiratory distress.

 

VITAL SIGNS:  Temp 98.4, pulse rate 96, respiratory rate is 40, oxygen 
saturation is 93% on 6 L nasal cannula, blood pressure 104/59.

 

HEENT:  Head:  Normocephalic.  Pupils equal and reactive.  Anicteric.  
Oropharynx: Mucous membranes dry.

 

NECK:  Supple.  No lymphadenopathy.

 

RESPIRATORY:  Diminished breath sounds.  Diffuse rhonchi with bilateral 
expiratory wheezing.  Increased work of breathing and tachypnea.

 

CARDIAC:  Tachycardic.  Soft systolic murmur heard throughout.

 

ABDOMEN:  Soft, nontender, nondistended.

 

EXTREMITIES:  She does have +2 lower extremity edema, more pronounced in the 
right lower extremity.  +1 DP.

 

NEUROLOGIC:  Alert and oriented x3.

 

 LABORATORY DATA:  White count 1.7, hemoglobin 7.8, hematocrit 22, platelets 
36. INR is 1.12.  Blood gases, pH of 7.45, PCO2 of 24, PO2 is 115.  Sodium is 
112, potassium 4.3, chloride 84, bicarb 19, BUN 12, creatinine 0.39.  Total 
bili is 1.9, AST 26, ALT is 17, troponin 0.31, CRP is 108, BNP is 381.

 

RADIOGRAPHIC DATA:  EKG shows sinus rhythm with a rate of 96.

 

Chest x-ray consistent with bilateral infiltrates and some pulmonary vascular 
congestion superimposed on this.

 

ASSESSMENT:  This is an 85-year-old female with a past medical history of CLL, 
not on treatment, with pancytopenia, who presents to the emergency room with 
worsening respiratory distress.

 

1.  Acute respiratory distress.  Assessment:  Most likely etiology is healthcare
- associated pneumonia with a likely component of acute compensated congestive 
heart failure with an elevated troponin.  She does not have any chest pain.  

Plan:  I am switching over to Vapotherm for work of breathing.  I am going to 
continue on broad spectrum antibiotics.  We will change her to vancomycin and 
Zosyn for now.  Obtain sputum cultures, legionella, pneumococcal.  Blood 
cultures were drawn in the emergency room, which need to be followed up on.  I 
think there is a component of COPD.  It appears that they thought this 2 weeks 
ago as well.  We will continue her on prednisone and nebulizer treatments and 
DuoNeb as well.  Also going to order an echocardiogram to look for any factor 
contributing to her respiratory distress.  We will also trend her troponin.

2.  Hyponatremia.  Assessment:  Likely related to syndrome of inappropriate 
antidiuretic hormone secretion from her pneumonia.  She appears dry to me 
despite having component of heart failure on her chest x-ray.  

Plan:  She got Lasix in the emergency room. We are going to give her a bolus of 
250 cc and repeat her BNP. Check her urine osmolality, serum osmolality and 
urine sodium.  If no response, will start low dose of hypertonic saline. 



3.  Chronic medical problems.  We will resume her home medications as 
prescribed.

4.  FEN:  If the patient could tolerate a regular diet, we will start her on 
one if her respiratory status improves on Vapotherm.

5.  DVT prophylaxis:  We will order SCDs in the setting of the patient's 
thrombocytopenia, anticoagulation is contraindicated.

6.  Code status:  The patient confirms she is a DNR/DNI.

 

PATIENT TIME:  Greater than 50 minutes spent doing the history and physical, 
more than half the time spent in direct patient contact and critical care time.
  I will sign out to Dr. Yoder as well to take over in the morning.

 

 

 

[id number]/CPS #: [id number]

[facility] HospitalSAHRA

## 2019-02-05 NOTE — PN
Progress Note





- Progress Note


Date of Service: 02/05/19


Note: 





Patient received  cc bolus and lasix, Na remains 112.  Awaiting Ur NA.  

Will start low dose hypertonic saline.  Follow BMP closely.

## 2019-02-05 NOTE — CONSULT
Consult


Consult: 





Consultation Note -- Critical Care





Requesting Physician: Dr Nohemy Valdivia


Reason for consult: pneumonia, hyponatremia


Limitations in history/physical: none


Date of consult: 2/5/2019








HPI: 85y F w/pmhx of CLL not on tx, pancytopenia, hypothyroidism, GERD, h/o 

polio Right lower ext; recent admission 1/23 for CAP. She resides in [place] due to 

progressive decline in clinical status. Presented to ER 2/5 for increasing 

dyspnea, productive cough, lightheadedness+. no diarrhea/blood over the past 

few days+. She presented with low O2 sat 91% on 2 L, tmax 99.5, tachycardic, 

tachypneic. CXR demonstrated bilateral lower lobe congestion vs infiltrate. She 

was placed on hiflow, IV abx for possible pneumonia. Overnight on hiflow, 

remains on it now. SHe has some tachypnea, afebrile. on 40% 30lpm hiflow. cough+

, sputum+. Developed sudden increased sob. She has baseline Right leg swelling, 

uses a walker otherwise.








ROS: negative except for pertinent positives mentioned above. 





PMHx: Nasopharyngeal CLL 2003, not tx due to poor performance; pancytopenia; h/

o polio with Right lower ext paralysis; osteoporosis, hypothryoidism, HLD, GERD





PSHx: none





Family History: noncontributory





Social History: Alcohol-no, Smoking-no, Drug use-no; Lives at [facility]. 

ambulates+. 





Allergies: NKDA





Home Medications: 


Calcitonin NASAL(NF) [Fortical(NR)] 1 mg ALT NARE DAILY 01/22/19 [History 

Confirmed 01/22/19]


Levothyroxine TAB* [Synthroid 137 MCG TAB*] 137 mcg PO DAILY 01/22/19 [History 

Confirmed 01/22/19]


Omeprazole CAP (NF) [Prilosec CAP* 20 MG] 20 mg PO DAILY 01/22/19 [History 

Confirmed 01/22/19]


Raloxifene (NF) [Evista(NF)] 60 mg PO DAILY 01/22/19 [History Confirmed 01/22/19

]


Simvastatin TAB(NF) [Zocor 20 MG (NF)] 20 mg PO DAILY 01/22/19 [History 

Confirmed 01/22/19]


Acetaminophen TAB* [Tylenol TAB*] 650 mg PO Q6H PRN  tab 01/23/19 [Rx]


Albuterol HFA INHALER* [Ventolin HFA Inhaler*] 2 puff INH Q6H PRN #1 mdi 01/23/ 19 [Rx]


Amoxicillin/Clavulanate TAB* [Augmentin *] 875 mg PO BID #20 tab 01/23/ 19 [Rx]








Tele: NSR





Vitals: 


 Vital Signs











Temp  98.4 F   02/05/19 12:00


 


Pulse  84   02/05/19 15:01


 


Resp  39   02/05/19 15:01


 


BP  120/62   02/05/19 15:00


 


Pulse Ox  93   02/05/19 15:01








 Intake & Output











 02/04/19 02/05/19 02/05/19





 18:59 06:59 18:59


 


Intake Total  200 1510


 


Output Total  50 0


 


Balance  150 1510


 


Weight  61.825 kg 


 


Intake:   


 


  IV Fluids  200 216


 


    3% Sodium Chloride   136


 


    NS (0.9%)   80


 


  IVPB   454


 


    ABX - VANCOMYCIN   231


 


    ABX - ZOSYN   223


 


  Oral   840


 


Output:   


 


  Urine  50 0














O2/Vent: hiflow 40% 30lpm





Infusions: 3% Na @ 30cc/hr





Current Medications: 


Acetaminophen (Tylenol Tab*)  650 mg PO Q4H PRN


   PRN Reason: FEVER/PAIN


Al Hydrox/Mg Hydrox/Simethicone (Maalox Plus*)  30 ml PO Q6H PRN


   PRN Reason: INDIGESTION


Albuterol (Ventolin 2.5 Mg/3 Ml Neb.Sol*)  2.5 mg INH Q2H PRN


   PRN Reason: SOB/WHEEZING


Albuterol/Ipratropium (Duoneb (Albuterol 2.5 Mg/Ipratropium 0.5 Mg))  1 neb INH 

Q4H PRN


   PRN Reason: SOB/WHEEZING


   Last Admin: 02/05/19 12:16 Dose:  1 neb


Calcitonin Horner (Fortical(Nr))  200 units ALT NARE DAILY DION; Protocol


   Last Admin: 02/05/19 09:21 Dose:  Not Given


Piperacillin Sod/Tazobactam (Sod 3.375 gm/ Sodium Chloride)  100 mls @ 25 mls/

hr IVPB Q8H DION


   Last Admin: 02/05/19 15:10 Dose:  25 mls/hr


Vancomycin HCl 1,000 mg/ (Sodium Chloride)  250 mls @ 166.667 mls/hr IVPB Q12H 

[facility]


Levothyroxine Sodium (Synthroid Tab*)  137 mcg PO DAILY@0600 [facility]


   Last Admin: 02/05/19 06:19 Dose:  137 mcg


Methylprednisolone Sodium Succinate (Solu-Medrol 40 Mg)  40 mg IV Q8H [facility]


Morphine Sulfate (Morphine Oral.Soln 10 Mg*)  2 mg PO Q2H PRN


   PRN Reason: Pain or work of breathing 


   Last Admin: 02/05/19 02:53 Dose:  2 mg


Ondansetron HCl (Zofran Inj*)  4 mg IV Q4H PRN


   PRN Reason: NAUSEA/VOMITING


Oxycodone/Acetaminophen (Percocet 5/325 Tab*)  1 tab PO Q4H PRN


   PRN Reason: PAIN


Pantoprazole Sodium (Protonix Tab*)  40 mg PO DAILY [facility]


   Last Admin: 02/05/19 09:16 Dose:  40 mg


Pharmacy Consult (Zosyn Per Pharmacy*)  1 note FOLLOW UP .ZOSYN PER PHARMACY [facility]


Pharmacy Consult (Vancomycin Per Pharmacy*)  1 note FOLLOW UP . PRN


   PRN Reason: PER PROTOCOL


Pharmacy Profile Note (Vancomycin Trough Check)  1 note FOLLOW UP 0530 [facility]


   Stop: 02/07/19 05:31











Physical Exam:


General: awake, alert, mild resp distress+, no diaphoresis


Head: normocephalic, atraumatic


HEENT: no pallor, no icterus, moist mucous membranes


Neck: soft, supple, no jvd, no stridor


CVS: normal rate, regular, no murmur


Resp: bilateral air entry, basilar crackles+, scattered mild wheeze+, no rhonchi

, no acc muscle use


Abdomen: soft, nontender, nondistended, bowel sounds +


Ext: pulses+, warm; Right LE edema 2+, Left lower neg; both warm


Skin: intact


Neuro: awake, alert, orientedx3, moving all extremities, no gross focal deficit





Labs: 


 Laboratory Results - last 24 hr











  02/04/19 02/04/19 02/04/19





  22:30 22:31 22:31


 


WBC   


 


RBC   


 


Hgb   


 


Hct   


 


MCV   


 


MCH   


 


MCHC   


 


RDW   


 


Plt Count   


 


MPV   


 


Neut % (Auto)   


 


Lymph % (Auto)   


 


Mono % (Auto)   


 


Eos % (Auto)   


 


Baso % (Auto)   


 


Absolute Neuts (auto)   


 


Absolute Lymphs (auto)   


 


Absolute Monos (auto)   


 


Absolute Eos (auto)   


 


Absolute Basos (auto)   


 


Absolute Nucleated RBC   


 


Nucleated RBC %   


 


Polychromasia   


 


Anisocytosis   


 


[place] Cells   


 


INR (Anticoag Therapy)   1.12 H 


 


APTT   27.8 


 


ABG pH   


 


ABG pCO2   


 


ABG pO2   


 


ABG HCO3   


 


ABG O2 Saturation   


 


ABG Base Excess   


 


Sodium    112 L*


 


Potassium    4.3


 


Chloride    84 L


 


Carbon Dioxide    19 L


 


Anion Gap    9


 


BUN    12


 


Creatinine    0.39 L


 


Est GFR ( Amer)    189.0


 


Est GFR (Non-Af Amer)    156.2


 


BUN/Creatinine Ratio    30.8 H


 


Glucose    113 H


 


Serum Osmolality  242 L  


 


Lactic Acid   


 


Calcium    8.0 L


 


Magnesium    2.2


 


Total Bilirubin    1.90 H


 


AST    26


 


ALT    17


 


Alkaline Phosphatase    32 L


 


Troponin I    0.31 H*


 


C-Reactive Protein    108.37 H


 


B-Natriuretic Peptide   


 


Total Protein    5.4 L


 


Albumin    3.0 L


 


Globulin    2.4


 


Albumin/Globulin Ratio    1.3


 


Prealbumin    7 L


 


TSH    2.99


 


Influenza A (Rapid)   


 


Influenza B (Rapid)   














  02/04/19 02/04/19 02/04/19





  22:39 22:39 22:39


 


WBC  1.7 L  


 


RBC  2.37 L  


 


Hgb  7.8 L  


 


Hct  22 L  


 


MCV  93  


 


MCH  33 H  


 


MCHC  35  


 


RDW  23 H  


 


Plt Count  36 L  


 


MPV  9.1  


 


Neut % (Auto)  85.0  


 


Lymph % (Auto)  12.4  


 


Mono % (Auto)  1.1  


 


Eos % (Auto)  1.0  


 


Baso % (Auto)  0.5  


 


Absolute Neuts (auto)  1.5  


 


Absolute Lymphs (auto)  0.2 L  


 


Absolute Monos (auto)  0  


 


Absolute Eos (auto)  0  


 


Absolute Basos (auto)  0  


 


Absolute Nucleated RBC  0  


 


Nucleated RBC %  0.3  


 


Polychromasia  1+  


 


Anisocytosis  2+  


 


[place] Cells  1+  


 


INR (Anticoag Therapy)   


 


APTT   


 


ABG pH   


 


ABG pCO2   


 


ABG pO2   


 


ABG HCO3   


 


ABG O2 Saturation   


 


ABG Base Excess   


 


Sodium   


 


Potassium   


 


Chloride   


 


Carbon Dioxide   


 


Anion Gap   


 


BUN   


 


Creatinine   


 


Est GFR ( Amer)   


 


Est GFR (Non-Af Amer)   


 


BUN/Creatinine Ratio   


 


Glucose   


 


Serum Osmolality   


 


Lactic Acid   1.3 


 


Calcium   


 


Magnesium   


 


Total Bilirubin   


 


AST   


 


ALT   


 


Alkaline Phosphatase   


 


Troponin I   


 


C-Reactive Protein   


 


B-Natriuretic Peptide    381 H


 


Total Protein   


 


Albumin   


 


Globulin   


 


Albumin/Globulin Ratio   


 


Prealbumin   


 


TSH   


 


Influenza A (Rapid)   


 


Influenza B (Rapid)   














  02/04/19 02/05/19 02/05/19





  22:39 02:02 02:39


 


WBC   


 


RBC   


 


Hgb   


 


Hct   


 


MCV   


 


MCH   


 


MCHC   


 


RDW   


 


Plt Count   


 


MPV   


 


Neut % (Auto)   


 


Lymph % (Auto)   


 


Mono % (Auto)   


 


Eos % (Auto)   


 


Baso % (Auto)   


 


Absolute Neuts (auto)   


 


Absolute Lymphs (auto)   


 


Absolute Monos (auto)   


 


Absolute Eos (auto)   


 


Absolute Basos (auto)   


 


Absolute Nucleated RBC   


 


Nucleated RBC %   


 


Polychromasia   


 


Anisocytosis   


 


[place] Cells   


 


INR (Anticoag Therapy)   


 


APTT   


 


ABG pH  7.45  


 


ABG pCO2  24 L  


 


ABG pO2  115 H  


 


ABG HCO3  20.6  


 


ABG O2 Saturation  99.4 H  


 


ABG Base Excess  -5.5 L  


 


Sodium   112 L* 


 


Potassium   4.2 


 


Chloride   84 L 


 


Carbon Dioxide   14 L* 


 


Anion Gap   14 H 


 


BUN   13 


 


Creatinine   0.41 L 


 


Est GFR ( Amer)   178.4 


 


Est GFR (Non-Af Amer)   147.4 


 


BUN/Creatinine Ratio   31.7 H 


 


Glucose   148 H 


 


Serum Osmolality   


 


Lactic Acid   


 


Calcium   7.8 L 


 


Magnesium   


 


Total Bilirubin   


 


AST   


 


ALT   


 


Alkaline Phosphatase   


 


Troponin I   0.23 H* 


 


C-Reactive Protein   


 


B-Natriuretic Peptide   


 


Total Protein   


 


Albumin   


 


Globulin   


 


Albumin/Globulin Ratio   


 


Prealbumin   


 


TSH   


 


Influenza A (Rapid)    Negative


 


Influenza B (Rapid)    Negative














  02/05/19 02/05/19 02/05/19





  05:58 05:58 10:45


 


WBC   1.4 L 


 


RBC   2.30 L 


 


Hgb   7.6 L 


 


Hct   21 L 


 


MCV   93 


 


MCH   33 H 


 


MCHC   36 


 


RDW   23 H 


 


Plt Count   32 L 


 


MPV   9.9 


 


Neut % (Auto)   92.1 


 


Lymph % (Auto)   6.7 


 


Mono % (Auto)   0.8 


 


Eos % (Auto)   0.1 


 


Baso % (Auto)   0.3 


 


Absolute Neuts (auto)   1.3 L 


 


Absolute Lymphs (auto)   0.1 L 


 


Absolute Monos (auto)   0 


 


Absolute Eos (auto)   0 


 


Absolute Basos (auto)   0 


 


Absolute Nucleated RBC   0 


 


Nucleated RBC %   0.2 


 


Polychromasia   


 


Anisocytosis   


 


Susie Cells   


 


INR (Anticoag Therapy)   


 


APTT   


 


ABG pH   


 


ABG pCO2   


 


ABG pO2   


 


ABG HCO3   


 


ABG O2 Saturation   


 


ABG Base Excess   


 


Sodium  114 L*   115 L*


 


Potassium  4.2  


 


Chloride  86 L  


 


Carbon Dioxide  17 L  


 


Anion Gap  11  


 


BUN  14  


 


Creatinine  0.38 L  


 


Est GFR ( Amer)  194.8  


 


Est GFR (Non-Af Amer)  161.0  


 


BUN/Creatinine Ratio  36.8 H  


 


Glucose  131 H  


 


Serum Osmolality   


 


Lactic Acid   


 


Calcium  7.5 L  


 


Magnesium   


 


Total Bilirubin  1.60 H  


 


AST  22  


 


ALT  15  


 


Alkaline Phosphatase  29 L  


 


Troponin I   


 


C-Reactive Protein   


 


B-Natriuretic Peptide   


 


Total Protein  5.0 L  


 


Albumin  2.8 L  


 


Globulin  2.2  


 


Albumin/Globulin Ratio  1.3  


 


Prealbumin   


 


TSH   


 


Influenza A (Rapid)   


 


Influenza B (Rapid)   














  02/05/19





  15:00


 


WBC 


 


RBC 


 


Hgb 


 


Hct 


 


MCV 


 


MCH 


 


MCHC 


 


RDW 


 


Plt Count 


 


MPV 


 


Neut % (Auto) 


 


Lymph % (Auto) 


 


Mono % (Auto) 


 


Eos % (Auto) 


 


Baso % (Auto) 


 


Absolute Neuts (auto) 


 


Absolute Lymphs (auto) 


 


Absolute Monos (auto) 


 


Absolute Eos (auto) 


 


Absolute Basos (auto) 


 


Absolute Nucleated RBC 


 


Nucleated RBC % 


 


Polychromasia 


 


Anisocytosis 


 


[place] Cells 


 


INR (Anticoag Therapy) 


 


APTT 


 


ABG pH 


 


ABG pCO2 


 


ABG pO2 


 


ABG HCO3 


 


ABG O2 Saturation 


 


ABG Base Excess 


 


Sodium  117 L*


 


Potassium 


 


Chloride 


 


Carbon Dioxide 


 


Anion Gap 


 


BUN 


 


Creatinine 


 


Est GFR ( Amer) 


 


Est GFR (Non-Af Amer) 


 


BUN/Creatinine Ratio 


 


Glucose 


 


Serum Osmolality 


 


Lactic Acid 


 


Calcium 


 


Magnesium 


 


Total Bilirubin 


 


AST 


 


ALT 


 


Alkaline Phosphatase 


 


Troponin I 


 


C-Reactive Protein 


 


B-Natriuretic Peptide 


 


Total Protein 


 


Albumin 


 


Globulin 


 


Albumin/Globulin Ratio 


 


Prealbumin 


 


TSH 


 


Influenza A (Rapid) 


 


Influenza B (Rapid) 














Imaging: 


cxr 2/4 - bilateral pulm congestion vs infiltrate








Assessment: 85y F w/pmhx of CLL not on tx, pancytopenia, hypothyroidism, GERD, h

/o polio Right lower ext; recent admission 1/23 for CAP. She resides in [place] due 

to progressive decline in clinical status. Presented to ER 2/5 for increasing 

dyspnea, productive cough, lightheadedness+. no diarrhea/blood over the past 

few days+. She presented with low O2 sat 91% on 2 L, tmax 99.5, tachycardic, 

tachypneic. CXR demonstrated bilateral lower lobe congestion vs infiltrate. She 

was placed on hiflow, IV abx for possible pneumonia. Overnight on hiflow, 

remains on it now. SHe has some tachypnea, afebrile. on 40% 30lpm hiflow. cough+

, sputum+. Developed sudden increased sob. She has baseline Right leg swelling, 

uses a walker otherwise.








-acute hypoxic respiratory failure


-Pulmonary edema vs pneumonia


-Severe Hyponatremia


CLL


Pancytopenia


h/o polio











Plan:


Neuro- stable. baseline Right Le weakness from polio. Delirium prec





CVS- BP stable. Not tachy. CXr with pulm congestion, +. Given picture 

seems more congestion that focal infiltrative process. No clear septic criteria 

met. Cont empiric IV abx for now. Stop Hypertonic 3% saline. Na has improved. 

Will start Lasix, give 40mg IV push x1. cont to check BMP q4h.


-Reviewed TTE 2/5 - normal LV fxn, diastolic dysfxn noted.





Resp- hypoxic, on hiflow. still has increased WOB. does not necessarily require 

NIV now. Repeat CXR now. IV lasix. Solumedrol IV. Bronchodilators 4h. Sputum 

culture to be sent. Empiric IV abx.





ID- afebrile. leukopenic. CLL+. CXR with bilateral infiltrates, may be 

congestion>pneumonia. Empiric IV zosyn (day#1) and vanco (day#1) abx till 

culture returns. Resp Cx to be sent. Urine leg/strept neg. 





GI- regular diet. monitor for more distress, make NPO then. PPI po. Aspiration 

prec.





Renal- Cr okay. K okay. Mild acidosis. Making urine. Hyponatremia, signs of 

volume overload/congestion. D/C hypertonic saline. Lasix 40mg IV x1 now.





Heme- Pancytopenic, known from prior. monitoring counts. no bleeding noted. 

stable h/h.





Endo- fingerstick prn


Musculsk- pressure ulcer prophylaxis. Bedrest.


Wounds- none


Nutrition- reg diet








DVT prophylaxis: -


GI prophylaxis: ppi


Central Line: no


Arterial Line: no


Wheeler Cathetor: no








Disposition:  Patient requires Critical Care/ICU for hyponatremia, respiratory 

failure





Patient Clinical Status:  critical, unstable





Code Status: DNI








Total Critical Care time is 35 minutes, excluding procedures/teaching





Yong Yoder MD


Intensivist


(Electronically Signed)

## 2019-02-05 NOTE — ECHO
Patient:      NY ATKINSON  

[facility] Rec#:     [id number]            :          1933          

Date:         2019            Age:          85y                 

Account#:     [id number]          Height:       157 cm / 61.8 in

Accession#:   [id number]           Weight:       59.1 kg / 130.3 lbs

Sex:          F                     BSA:          1.59

Room#:        [facility]                

Admit Date#:  2019          

Type:         Inpatient

 

Referring:    Nohemy Valdivia

Reading:      Amilcar Murillo MD

Sonographer:  Meaghan Cnode RDCS

CC:           Grey Coello MD

______________________________________________________________________

 

Transthoracic Echocardiogram

 

Indication:

Shortness of breath

BP:           97/52

HR:           87

Rhythm:       NSR

 

Findings     

History:

Chronic lymphocytic leukemia, chemotherapy and radiation. 

 

Technical Comments:

The study quality is fair.  The study is technically limited due to poor

parasternal windows.  

 

Left Ventricle:

The left ventricular chamber size is decreased. There is no left

ventricular hypertrophy.   Global left ventricular wall motion and

contractility are within normal limits. Left ventricular systolic

function is at the lower limits of normal.  The estimated ejection

fraction is 50-55%.  Abnormal left ventricular diastolic function is

observed. There is an E to A reversal in the mitral valve flow pattern

suggestive of diastolic dysfunction. 

 

Left Atrium:

The left atrial chamber size is normal. 

 

Right Ventricle:

The right ventricular cavity size is normal. The right ventricular

global systolic function is normal. 

 

Right Atrium:

The right atrial cavity size is normal. 

 

Aortic Valve:

The aortic valve is trileaflet. The aortic valve leaflets are mildly

thickened. There is mild aortic regurgitation.  There is no evidence of

aortic stenosis. The measured aortic regurgitation pressure half-time is

464 msec.  

 

Mitral Valve:

There is mitral annular calcification. The mitral valve leaflets are

mildly thickened. There is trace to mild mitral regurgitation. There is

no evidence of mitral stenosis. 

 

Tricuspid Valve:

The tricuspid valve leaflets are normal.  There is mild tricuspid

regurgitation. The right ventricular systolic pressure is estimated at

24 mmHg.  No pulmonary hypertension is noted. There is no tricuspid

stenosis. 

 

Pulmonic Valve:

The pulmonic valve appears normal. There is a trace pulmonic

regurgitation.  There is no pulmonic stenosis.  

 

Pericardium:

There is no significant pericardial effusion. 

 

Aorta:

There is mild dilatation of the ascending aorta. The aortic arch is not

well visualized.  The aortic root is normal in size. 

 

Pulmonary Artery:

The main pulmonary artery is not well visualized. 

 

Venous:

The inferior vena cava appears normal in size. There is a greater than

50% respiratory change in the inferior vena cava dimension. 

 

Summary:

There was not any prior study for comparison. 

 

Conclusions

Global left ventricular wall motion and contractility are within normal

limits.

Left ventricular systolic function is at the lower limits of normal. 

The estimated ejection fraction is 50-55%. 

There is an E to A reversal in the mitral valve flow pattern suggestive

of diastolic dysfunction.

The aortic valve leaflets are mildly thickened.

There is mild aortic regurgitation. 

There is no evidence of aortic stenosis.

The mitral valve leaflets are mildly thickened.

There is trace to mild mitral regurgitation.

There is mild tricuspid regurgitation.

No pulmonary hypertension is noted.

There is no significant pericardial effusion.

 

Measurements     

Name                    Value         Normal Range            

RVIDd (AP) 2D           2.1 cm        (0.9 - 2.6)             

RVDdMajor (2D)          3.4 cm        (2.2 - 4.4)             

RAd ISD 4CH             4.3 cm        (3.4 - 4.9)             

RA (A4C)W               3 cm          (2.9 - 4.6)             

IVSd (2D)               1 cm          (0.6 - 1)               

LVPWd (2D)              0.9 cm        (0.6 - 1)               

LVIDd (2D)              3.5 cm        (3.6 - 5.4)             

LVIDs (2D)              2.7 cm        -                        

LV FS (2D)              21 %          (25 - 45)               

Aortic Annulus          2.1 cm        (1.4 - 2.6)             

Ao root diameter (2D)   2.9 cm        (2.1 - 3.5)             

Ascending Ao            3.8 cm        (2.1 - 3.4)             

LA dimension (AP) 2D    3.7 cm        (2.3 - 3.8)             

LAd ISD 4CH             4.8 cm        (2.9 - 5.3)             

LA ISD 4CH W            3.3 cm        (2.5 - 4.5)             

 

Name                    Value         Normal Range            

LA ESV BP (A/L) index   18 ml/m2      -                        

 

Name                    Value         Normal Range            

MV E-wave Vmax          0.7 m/sec     -                        

MV deceleration time    254 msec      -                        

MV A-wave Vmax          1 m/sec       -                        

MV E:A ratio            0.7 ratio     -                        

LV septal e' Vmax       0.06 m/sec    -                        

LV lateral e' Vmax      0.07 m/sec    -                        

LV E:e' septal ratio    11.7 ratio    -                        

LV E:e' lateral ratio   10 ratio      -                        

 

Name                    Value         Normal Range            

AV Vmax                 1.3 m/sec     -                        

AV VTI                  25 cm         -                        

AV peak gradient        7 mmHg        -                        

AV mean gradient        3 mmHg        -                        

LVOT Vmax               1 m/sec       -                        

LVOT VTI                20 cm         -                        

LVOT peak gradient      4 mmHg        -                        

LVOT mean gradient      3 mmHg        -                        

AR PHT                  464 msec      -                        

 

Name                    Value         Normal Range            

TR Vmax                 2.3 m/sec     -                        

TR peak gradient        21 mmHg       -                        

RAP                     3 mmHg        -                        

RVSP                    24 mmHg       -                        

IVC diameter            1.7 cm        -                        

 

Name                    Value         Normal Range            

PV Vmax                 0.8 m/sec     -                        

PV peak gradient        2 mmHg        -                        

 

Electronically signed by: Amilcar Murillo MD on 2019 08:44:52

## 2019-02-06 LAB
ANION GAP SERPL CALC-SCNC: 7 MMOL/L (ref 2–11)
BUN SERPL-MCNC: 18 MG/DL (ref 6–24)
BUN/CREAT SERPL: 41.9 (ref 8–20)
CALCIUM SERPL-MCNC: 7.6 MG/DL (ref 8.6–10.3)
CHLORIDE SERPL-SCNC: 93 MMOL/L (ref 101–111)
GLUCOSE SERPL-MCNC: 157 MG/DL (ref 70–100)
HCO3 SERPL-SCNC: 20 MMOL/L (ref 22–32)
HCT VFR BLD AUTO: 20 % (ref 35–47)
HGB BLD-MCNC: 7.3 G/DL (ref 12–16)
MCH RBC QN AUTO: 33 PG (ref 27–31)
MCHC RBC AUTO-ENTMCNC: 36 G/DL (ref 31–36)
MCV RBC AUTO: 93 FL (ref 80–97)
PLATELET # BLD AUTO: 25 10^3/UL (ref 150–450)
POTASSIUM SERPL-SCNC: 4 MMOL/L (ref 3.5–5)
RBC # BLD AUTO: 2.19 10^6/UL (ref 4–5.4)
SODIUM SERPL-SCNC: 120 MMOL/L (ref 135–145)
WBC # BLD AUTO: 1.6 10^3/UL (ref 3.5–10.8)

## 2019-02-06 RX ADMIN — VANCOMYCIN HYDROCHLORIDE SCH MLS/HR: 1 INJECTION, POWDER, LYOPHILIZED, FOR SOLUTION INTRAVENOUS at 05:11

## 2019-02-06 RX ADMIN — PIPERACILLIN AND TAZOBACTAM SCH MLS/HR: 3; .375 INJECTION, POWDER, LYOPHILIZED, FOR SOLUTION INTRAVENOUS; PARENTERAL at 22:40

## 2019-02-06 RX ADMIN — METHYLPREDNISOLONE SODIUM SUCCINATE SCH MG: 40 INJECTION, POWDER, FOR SOLUTION INTRAMUSCULAR; INTRAVENOUS at 16:12

## 2019-02-06 RX ADMIN — IPRATROPIUM BROMIDE AND ALBUTEROL SULFATE PRN NEB: .5; 3 SOLUTION RESPIRATORY (INHALATION) at 08:50

## 2019-02-06 RX ADMIN — METHYLPREDNISOLONE SODIUM SUCCINATE SCH MG: 40 INJECTION, POWDER, FOR SOLUTION INTRAMUSCULAR; INTRAVENOUS at 22:31

## 2019-02-06 RX ADMIN — IPRATROPIUM BROMIDE AND ALBUTEROL SULFATE SCH NEB: .5; 3 SOLUTION RESPIRATORY (INHALATION) at 19:20

## 2019-02-06 RX ADMIN — METHYLPREDNISOLONE SODIUM SUCCINATE SCH MG: 40 INJECTION, POWDER, FOR SOLUTION INTRAMUSCULAR; INTRAVENOUS at 07:20

## 2019-02-06 RX ADMIN — PIPERACILLIN AND TAZOBACTAM SCH MLS/HR: 3; .375 INJECTION, POWDER, LYOPHILIZED, FOR SOLUTION INTRAVENOUS; PARENTERAL at 07:20

## 2019-02-06 RX ADMIN — LEVOTHYROXINE SODIUM SCH MCG: 137 TABLET ORAL at 05:07

## 2019-02-06 RX ADMIN — IPRATROPIUM BROMIDE AND ALBUTEROL SULFATE SCH NEB: .5; 3 SOLUTION RESPIRATORY (INHALATION) at 23:52

## 2019-02-06 RX ADMIN — PIPERACILLIN AND TAZOBACTAM SCH MLS/HR: 3; .375 INJECTION, POWDER, LYOPHILIZED, FOR SOLUTION INTRAVENOUS; PARENTERAL at 14:41

## 2019-02-06 RX ADMIN — CALCITRIOL SCH: 1 SOLUTION ORAL at 11:17

## 2019-02-06 RX ADMIN — MORPHINE SULFATE PRN MG: 10 SOLUTION ORAL at 09:21

## 2019-02-06 RX ADMIN — MORPHINE SULFATE PRN MG: 10 SOLUTION ORAL at 22:50

## 2019-02-06 RX ADMIN — IPRATROPIUM BROMIDE AND ALBUTEROL SULFATE PRN NEB: .5; 3 SOLUTION RESPIRATORY (INHALATION) at 14:26

## 2019-02-06 RX ADMIN — VANCOMYCIN HYDROCHLORIDE SCH MLS/HR: 1 INJECTION, POWDER, LYOPHILIZED, FOR SOLUTION INTRAVENOUS at 18:14

## 2019-02-06 RX ADMIN — PANTOPRAZOLE SODIUM SCH MG: 40 TABLET, DELAYED RELEASE ORAL at 09:21

## 2019-02-06 NOTE — PN
Progress Note





- Progress Note


Date of Service: 02/06/19


Note: 





Consultation Note -- Critical Care





24 hour events


-no events overnight


-was on hiflow; i asked and switched patient to NC this morning


-mild tachypnea+ still; scattered wheeze heard


-made urine overnight+


-afebrile, no sputum








Tele: sinus tachy





Vitals: 


 Vital Signs











Temp  97.0 F   02/06/19 08:00


 


Pulse  98   02/06/19 14:27


 


Resp  18   02/06/19 14:27


 


BP  127/73   02/06/19 14:01


 


Pulse Ox  95   02/06/19 14:27








 Intake & Output











 02/05/19 02/06/19 02/06/19





 18:59 06:59 18:59


 


Intake Total 2852 649 6140


 


Output Total 1800 1800 3200


 


Balance -290 1223 -1657


 


Intake:   


 


  IV Fluids 216 108 268


 


    3% Sodium Chloride 136  


 


    ABX - VANCOMYCIN  72 


 


    ABX - ZOSYN  36 100


 


    NS (0.9%) 80  168


 


  IVPB 454 469 


 


    ABX - VANCOMYCIN 231 265 


 


    ABX - ZOSYN 223 204 


 


  Oral 840  1275


 


Output:   


 


  Urine 1800 1800 3200


 


  Post Void Residual 0  


 


Other:   


 


  # Bowel Movements 1  


 


  Estimated Stool Amount Small  Small











 





O2/Vent: NC 7





Infusions: heplock





Current Medications: 


Acetaminophen (Tylenol Tab*)  650 mg PO Q4H PRN


   PRN Reason: FEVER/PAIN


Al Hydrox/Mg Hydrox/Simethicone (Maalox Plus*)  30 ml PO Q6H PRN


   PRN Reason: INDIGESTION


Albuterol (Ventolin 2.5 Mg/3 Ml Neb.Sol*)  2.5 mg INH Q2H PRN


   PRN Reason: SOB/WHEEZING


Albuterol/Ipratropium (Duoneb (Albuterol 2.5 Mg/Ipratropium 0.5 Mg))  1 neb INH 

Q4H PRN


   PRN Reason: SOB/WHEEZING


   Last Admin: 02/06/19 14:26 Dose:  1 neb


Calcitonin [place] (Fortical(Nr))  200 units ALT NARE DAILY DION; Protocol


   Last Admin: 02/06/19 11:17 Dose:  Not Given


Furosemide (Lasix Iv*)  40 mg IV SLOW PU ONCE ONE


   Stop: 02/06/19 15:32


Piperacillin Sod/Tazobactam (Sod 3.375 gm/ Sodium Chloride)  100 mls @ 25 mls/

hr IVPB Q8H [facility]


   Last Admin: 02/06/19 14:41 Dose:  25 mls/hr


Vancomycin HCl 1,000 mg/ (Sodium Chloride)  250 mls @ 166.667 mls/hr IVPB Q12H 

[facility]


   Last Admin: 02/06/19 05:11 Dose:  166.667 mls/hr


Levothyroxine Sodium (Synthroid Tab*)  137 mcg PO DAILY@0600 [facility]


   Last Admin: 02/06/19 05:07 Dose:  137 mcg


Methylprednisolone Sodium Succinate (Solu-Medrol 40 Mg)  40 mg IV Q8H [facility]


   Last Admin: 02/06/19 07:20 Dose:  40 mg


Morphine Sulfate (Morphine Oral.Soln 10 Mg*)  2 mg PO Q2H PRN


   PRN Reason: Pain or work of breathing 


   Last Admin: 02/06/19 09:21 Dose:  2 mg


Ondansetron HCl (Zofran Inj*)  4 mg IV Q4H PRN


   PRN Reason: NAUSEA/VOMITING


Oxycodone/Acetaminophen (Percocet 5/325 Tab*)  1 tab PO Q4H PRN


   PRN Reason: PAIN


Pantoprazole Sodium (Protonix Tab*)  40 mg PO DAILY [facility]


   Last Admin: 02/06/19 09:21 Dose:  40 mg


Pharmacy Consult (Zosyn Per Pharmacy*)  1 note FOLLOW UP .ZOSYN PER PHARMACY [facility]


Pharmacy Consult (Vancomycin Per Pharmacy*)  1 note FOLLOW UP . PRN


   PRN Reason: PER PROTOCOL


Pharmacy Profile Note (Vancomycin Trough Check)  1 note FOLLOW UP 0530 ONE


   Stop: 02/07/19 05:31











Physical Exam:


General: awake, alert, mild resp distress+, no diaphoresis


Head: normocephalic, atraumatic


HEENT: no pallor, no icterus, moist mucous membranes


Neck: soft, supple, no jvd, no stridor


CVS: normal rate, regular, no murmur


Resp: bilateral air entry, less basal crackles+, mild scattered wheeze, no 

rhonchi, no acc muscle use


Abdomen: soft, nontender, nondistended, bowel sounds +


Ext: pulses+, warm; Right LE edema 2+, Left lower neg; both warm


Skin: intact


Neuro: awake, alert, orientedx3, moving all extremities, no gross focal deficit





Labs: 


 Laboratory Results - last 24 hr











  02/05/19 02/05/19 02/06/19





  15:00 18:50 05:12


 


WBC   


 


RBC   


 


Hgb   


 


Hct   


 


MCV   


 


MCH   


 


MCHC   


 


RDW   


 


Plt Count   


 


MPV   


 


Sodium  117 L*  119 L*  120 L


 


Potassium   4.1  4.0


 


Chloride   90 L  93 L


 


Carbon Dioxide   17 L  20 L


 


Anion Gap   12 H  7


 


BUN   17  18


 


Creatinine   0.46 L  0.43 L


 


Est GFR ( Amer)   156.2  168.9


 


Est GFR (Non-Af Amer)   129.1  139.6


 


BUN/Creatinine Ratio   37.0 H  41.9 H


 


Glucose   194 H  157 H


 


Calcium   7.6 L  7.6 L














  02/06/19





  05:12


 


WBC  1.6 L


 


RBC  2.19 L


 


Hgb  7.3 L


 


Hct  20 L


 


MCV  93


 


MCH  33 H


 


MCHC  36


 


RDW  23 H


 


Plt Count  25 L


 


MPV  9.2


 


Sodium 


 


Potassium 


 


Chloride 


 


Carbon Dioxide 


 


Anion Gap 


 


BUN 


 


Creatinine 


 


Est GFR ( Amer) 


 


Est GFR (Non-Af Amer) 


 


BUN/Creatinine Ratio 


 


Glucose 


 


Calcium 

















Imaging: 


cxr 2/4 - bilateral pulm congestion vs infiltrate








Assessment: 85y F w/pmhx of CLL not on tx, pancytopenia, hypothyroidism, GERD, h

/o polio Right lower ext; recent admission 1/23 for CAP. She resides in [place] due 

to progressive decline in clinical status. Presented to ER 2/5 for increasing 

dyspnea, productive cough, lightheadedness+. no diarrhea/blood over the past 

few days+. She presented with low O2 sat 91% on 2 L, tmax 99.5, tachycardic, 

tachypneic. CXR demonstrated bilateral lower lobe congestion vs infiltrate. She 

was placed on hiflow, IV abx for possible pneumonia. Overnight on hiflow, 

remains on it now. SHe has some tachypnea, afebrile. on 40% 30lpm hiflow. cough+

, sputum+. Developed sudden increased sob. She has baseline Right leg swelling, 

uses a walker otherwise.








-acute hypoxic respiratory failure


-Pulmonary edema vs pneumonia


-Severe Hyponatremia


CLL


Pancytopenia


h/o polio











Plan:


Neuro- stable. baseline Right Le weakness from polio. Delirium prec





CVS- BP stable. GOod urine output, repeat lasix 40mg IV x1. Heplock currently. 

Na improving. CXR with some imrpoved pulm congestion/infiltrates at bases.  

Cont empiric IV abx for now.


-Reviewed TTE 2/5 - normal LV fxn, diastolic dysfxn noted.





Resp- hypoxic, but now transitioned to NC, doing better. Hiflow on standby. CXR 

2/6 with some improvement in congestion/infiltrate. Repeat IV lasix 40mg x1. 

Solumedrol IV, start taper tomorrow. Bronchodilators 4h. Sputum culture to be 

sent. Empiric IV abx.





ID- afebrile. leukopenic. CLL+. CXR with bilateral infiltrates/congestion, 

improved slightly. Empiric IV zosyn (day#2) and vanco (day#2). Resp Cx to be 

sent. Urine leg/strept neg. 





GI- regular diet. PPI po. Aspiration prec.





Renal- Cr okay. K 4.0. Improved acidosis. Good urine output s/p lasix; repeat 

lasix 40mg x1. Hyponatremia likely more CHF given fluid overload state, some 

possibility of SIADH with pneumonia. No IVF at this time.





Heme- Pancytopenic, known from prior. H/h slow downtrend, keep Hg>7. Discussed 

with Dr Neumann, monitoring counts for now.  





Endo- fingerstick prn


Musculsk- pressure ulcer prophylaxis. oob to chair


Wounds- none


Nutrition- reg diet








DVT prophylaxis: -


GI prophylaxis: ppi


Central Line: no


Arterial Line: no


Wheeler Cathetor: no








Disposition:  Patient requires Critical Care/ICU for hyponatremia, respiratory 

failure





Patient Clinical Status:  critical, stable





Code Status: DNI








Total Critical Care time is 35 minutes, excluding procedures/teaching





Yong Yoder MD


Intensivist


(Electronically Signed)

## 2019-02-07 LAB
ANION GAP SERPL CALC-SCNC: 7 MMOL/L (ref 2–11)
BUN SERPL-MCNC: 17 MG/DL (ref 6–24)
BUN/CREAT SERPL: 42.5 (ref 8–20)
CALCIUM SERPL-MCNC: 7.4 MG/DL (ref 8.6–10.3)
CHLORIDE SERPL-SCNC: 99 MMOL/L (ref 101–111)
GLUCOSE SERPL-MCNC: 161 MG/DL (ref 70–100)
HCO3 SERPL-SCNC: 21 MMOL/L (ref 22–32)
HCT VFR BLD AUTO: 20 % (ref 35–47)
HGB BLD-MCNC: 6.8 G/DL (ref 12–16)
MCH RBC QN AUTO: 33 PG (ref 27–31)
MCHC RBC AUTO-ENTMCNC: 35 G/DL (ref 31–36)
MCV RBC AUTO: 96 FL (ref 80–97)
PLATELET # BLD AUTO: 21 10^3/UL (ref 150–450)
POTASSIUM SERPL-SCNC: 3.8 MMOL/L (ref 3.5–5)
RBC # BLD AUTO: 2.05 10^6/UL (ref 4–5.4)
SODIUM SERPL-SCNC: 127 MMOL/L (ref 135–145)
WBC # BLD AUTO: 1.4 10^3/UL (ref 3.5–10.8)

## 2019-02-07 RX ADMIN — PANTOPRAZOLE SODIUM SCH MG: 40 TABLET, DELAYED RELEASE ORAL at 09:12

## 2019-02-07 RX ADMIN — PIPERACILLIN AND TAZOBACTAM SCH MLS/HR: 3; .375 INJECTION, POWDER, LYOPHILIZED, FOR SOLUTION INTRAVENOUS; PARENTERAL at 14:42

## 2019-02-07 RX ADMIN — PIPERACILLIN AND TAZOBACTAM SCH MLS/HR: 3; .375 INJECTION, POWDER, LYOPHILIZED, FOR SOLUTION INTRAVENOUS; PARENTERAL at 23:19

## 2019-02-07 RX ADMIN — METHYLPREDNISOLONE SODIUM SUCCINATE SCH MG: 40 INJECTION, POWDER, FOR SOLUTION INTRAMUSCULAR; INTRAVENOUS at 06:30

## 2019-02-07 RX ADMIN — METHYLPREDNISOLONE SODIUM SUCCINATE SCH MG: 40 INJECTION, POWDER, FOR SOLUTION INTRAMUSCULAR; INTRAVENOUS at 23:22

## 2019-02-07 RX ADMIN — IPRATROPIUM BROMIDE AND ALBUTEROL SULFATE SCH NEB: .5; 3 SOLUTION RESPIRATORY (INHALATION) at 19:36

## 2019-02-07 RX ADMIN — LEVOTHYROXINE SODIUM SCH MCG: 137 TABLET ORAL at 05:49

## 2019-02-07 RX ADMIN — CALCITRIOL SCH: 1 SOLUTION ORAL at 09:12

## 2019-02-07 RX ADMIN — IPRATROPIUM BROMIDE AND ALBUTEROL SULFATE SCH NEB: .5; 3 SOLUTION RESPIRATORY (INHALATION) at 03:18

## 2019-02-07 RX ADMIN — PIPERACILLIN AND TAZOBACTAM SCH MLS/HR: 3; .375 INJECTION, POWDER, LYOPHILIZED, FOR SOLUTION INTRAVENOUS; PARENTERAL at 06:35

## 2019-02-07 RX ADMIN — VANCOMYCIN HYDROCHLORIDE SCH MLS/HR: 1 INJECTION, POWDER, LYOPHILIZED, FOR SOLUTION INTRAVENOUS at 05:55

## 2019-02-07 RX ADMIN — IPRATROPIUM BROMIDE AND ALBUTEROL SULFATE SCH NEB: .5; 3 SOLUTION RESPIRATORY (INHALATION) at 14:08

## 2019-02-07 RX ADMIN — METHYLPREDNISOLONE SODIUM SUCCINATE SCH MG: 40 INJECTION, POWDER, FOR SOLUTION INTRAMUSCULAR; INTRAVENOUS at 14:42

## 2019-02-07 RX ADMIN — IPRATROPIUM BROMIDE AND ALBUTEROL SULFATE SCH NEB: .5; 3 SOLUTION RESPIRATORY (INHALATION) at 07:39

## 2019-02-07 NOTE — PN
Progress Note





- Progress Note


Date of Service: 02/07/19


Note: 





Progress Note -- Critical Care





24 hour events


-no events overnight; on NC 5 L


-cough+, minimal sputum+


-afebrile


-BP stable, HR stable


-no complaints by pateint, eating well








Tele: NSR





Vitals: 


 Vital Signs











Temp  97.3 F   02/07/19 03:58


 


Pulse  75   02/07/19 07:45


 


Resp  16   02/07/19 07:50


 


BP  131/70   02/07/19 07:45


 


Pulse Ox  99   02/07/19 07:45








 Intake & Output











 02/06/19 02/07/19 02/07/19





 18:59 06:59 18:59


 


Intake Total 1543 1391.2 


 


Output Total 3200 1100 


 


Balance -1657 291.2 


 


Weight  65.8 kg 


 


Intake:   


 


  IV Fluids 268 681.2 


 


    ABX - VANCOMYCIN  298 


 


    ABX - ZOSYN 100 214.2 


 


    NS (0.9%) 168 169 


 


  Oral 1275 710 


 


Output:   


 


  Urine 3200 1100 


 


Other:   


 


  Date of Last Bowel 2/6/19  





  Movement   


 


  # Bowel Movements 1  


 


  Estimated Stool Amount Small Small 


 


  # Voids  1 











 


 





O2/Vent: NC 5





Infusions: heplock





Current Medications: 


Acetaminophen (Tylenol Tab*)  650 mg PO Q4H PRN


   PRN Reason: FEVER/PAIN


Al Hydrox/Mg Hydrox/Simethicone (Maalox Plus*)  30 ml PO Q6H PRN


   PRN Reason: INDIGESTION


Albuterol (Ventolin 2.5 Mg/3 Ml Neb.Sol*)  2.5 mg INH Q2H PRN


   PRN Reason: SOB/WHEEZING


Albuterol/Ipratropium (Duoneb (Albuterol 2.5 Mg/Ipratropium 0.5 Mg))  1 neb INH 

RT.[id number] AWAKE DION


   Last Admin: 02/07/19 07:39 Dose:  1 neb


Calcitonin [place] (Fortical(Nr))  200 units ALT NARE DAILY DION; Protocol


   Last Admin: 02/07/19 09:12 Dose:  Not Given


Furosemide (Lasix Iv*)  20 mg IV ONCE ONE


   Stop: 02/07/19 10:00


Piperacillin Sod/Tazobactam (Sod 3.375 gm/ Sodium Chloride)  100 mls @ 25 mls/

hr IVPB Q8H [facility]


   Last Admin: 02/07/19 06:35 Dose:  25 mls/hr


Vancomycin HCl 1,000 mg/ (Sodium Chloride)  250 mls @ 166.667 mls/hr IVPB Q12H 

[facility]


   Last Admin: 02/07/19 05:55 Dose:  166.667 mls/hr


Levothyroxine Sodium (Synthroid Tab*)  137 mcg PO DAILY@0600 [facility]


   Last Admin: 02/07/19 05:49 Dose:  137 mcg


Methylprednisolone Sodium Succinate (Solu-Medrol 40 Mg)  40 mg IV Q8H [facility]


   Last Admin: 02/07/19 06:30 Dose:  40 mg


Morphine Sulfate (Morphine Oral.Soln 10 Mg*)  2 mg PO Q2H PRN


   PRN Reason: Pain or work of breathing 


   Last Admin: 02/06/19 22:50 Dose:  2 mg


Ondansetron HCl (Zofran Inj*)  4 mg IV Q4H PRN


   PRN Reason: NAUSEA/VOMITING


Oxycodone/Acetaminophen (Percocet 5/325 Tab*)  1 tab PO Q4H PRN


   PRN Reason: PAIN


Pantoprazole Sodium (Protonix Tab*)  40 mg PO DAILY [facility]


   Last Admin: 02/07/19 09:12 Dose:  40 mg


Pharmacy Consult (Zosyn Per Pharmacy*)  1 note FOLLOW UP .ZOSYN PER PHARMACY [facility]


Pharmacy Consult (Vancomycin Per Pharmacy*)  1 note FOLLOW UP . PRN


   PRN Reason: PER PROTOCOL


Pharmacy Profile Note (Vancomycin Trough Check)  1 note FOLLOW UP ONCE ONE


   Stop: 02/10/19 05:31











Physical Exam:


General: awake, alert, no resp distress, no diaphoresis


Head: normocephalic, atraumatic


HEENT: no pallor, no icterus, moist mucous membranes


Neck: soft, supple, no jvd, no stridor


CVS: normal rate, regular, no murmur


Resp: bilateral air entry, mild basal rhonchi only, no rhales/wheeze, no acc 

muscle use


Abdomen: soft, nontender, nondistended, bowel sounds +


Ext: pulses+, warm; Right LE edema 2+, Left lower neg; both warm


Skin: intact


Neuro: awake, alert, orientedx3, moving all extremities, no gross focal deficit





Labs: 


 Laboratory Results - last 24 hr











  02/07/19 02/07/19





  04:40 04:40


 


WBC   1.4 L


 


RBC   2.05 L


 


Hgb   6.8 L


 


Hct   20 L


 


MCV   96


 


MCH   33 H


 


MCHC   35


 


RDW   23 H


 


Plt Count   21 L


 


MPV   8.9


 


Sodium  127 L 


 


Potassium  3.8 


 


Chloride  99 L 


 


Carbon Dioxide  21 L 


 


Anion Gap  7 


 


BUN  17 


 


Creatinine  0.40 L 


 


Est GFR ( Amer)  183.6 


 


Est GFR (Non-Af Amer)  151.7 


 


BUN/Creatinine Ratio  42.5 H 


 


Glucose  161 H 


 


Calcium  7.4 L 


 


Vancomycin Trough  16.0 











 





Imaging: 


cxr 2/4 - bilateral pulm congestion vs infiltrate








Assessment: 85y F w/pmhx of CLL not on tx, pancytopenia, hypothyroidism, GERD, h

/o polio Right lower ext; recent admission 1/23 for CAP. She resides in [place] due 

to progressive decline in clinical status. Presented to ER 2/5 for increasing 

dyspnea, productive cough, lightheadedness+. no diarrhea/blood over the past 

few days+. She presented with low O2 sat 91% on 2 L, tmax 99.5, tachycardic, 

tachypneic. CXR demonstrated bilateral lower lobe congestion vs infiltrate. She 

was placed on hiflow, IV abx for possible pneumonia. Overnight on hiflow, 

remains on it now. SHe has some tachypnea, afebrile. on 40% 30lpm hiflow. cough+

, sputum+. Developed sudden increased sob. She has baseline Right leg swelling, 

uses a walker otherwise.








-acute hypoxic respiratory failure


-Pulmonary edema vs pneumonia


-Severe Hyponatremia


CLL


Pancytopenia


h/o polio











Plan:


Neuro- stable. baseline Right Le weakness from polio. Delirium prec


-oob to chair, pt/ot





CVS- BP stable. GOod urine output, s/p lasix. Hg <7, h/o monthly transfusion or 

as needed to keep hg>7. PRBC x1 to be tranfused today. Repeat Lasix 20mg IV x1 

today after transfusion. Na improving. CXR with some imrpoved pulm congestion/

infiltrates at bases.  Cont empiric IV abx for now.


-Reviewed TTE 2/5 - normal LV fxn, diastolic dysfxn noted.





Resp- hypoxia improving, on 5 L NC. Pulm congestion vs pneumonia. Cont Diuresis 

daily. Cont IV abx. CXR 2/6 with some improvement in congestion/infiltrate. 

Taper Solumedrol IV. Bronchodilators 4h. No sputum culture, not much coughed 

up. Empiric IV abx.





ID- afebrile. leukopenic. CLL+. CXR with bilateral infiltrates/congestion, 

improved slightly. Empiric IV zosyn (day#3/5). D/C vanco today (day#2). Urine 

leg/strept neg. Improving. WIll plan to limit IV abx to 5 days total.





GI- regular diet. PPI po. Aspiration prec.





Renal- Cr okay. K 3.8, Replete PO KCL. Good urine output s/p lasix; repeat 

lasix 20mg x1. Hyponatremia improving, likely from hypervolemic hyponat





Heme- Pancytopenic, known from prior. H/h slow downtrend, now 6.8, transfuse 1 

prbc now. Discussed with Dr Neumann, monitoring counts for now.  





Endo- fingerstick prn


Musculsk- pressure ulcer prophylaxis. oob to chair


Wounds- none


Nutrition- reg diet








DVT prophylaxis: -


GI prophylaxis: ppi


Central Line: no


Arterial Line: no


Wheeler Cathetor: no








Disposition:  stable for transfer to medical floor





Patient Clinical Status:  stable





Code Status: DNI











Yong Yoder MD


Intensivist


(Electronically Signed)

## 2019-02-08 LAB
ANION GAP SERPL CALC-SCNC: 8 MMOL/L (ref 2–11)
BUN SERPL-MCNC: 18 MG/DL (ref 6–24)
BUN/CREAT SERPL: 40.9 (ref 8–20)
CALCIUM SERPL-MCNC: 7.7 MG/DL (ref 8.6–10.3)
CHLORIDE SERPL-SCNC: 101 MMOL/L (ref 101–111)
GLUCOSE SERPL-MCNC: 127 MG/DL (ref 70–100)
HCO3 SERPL-SCNC: 22 MMOL/L (ref 22–32)
HCT VFR BLD AUTO: 24 % (ref 35–47)
HGB BLD-MCNC: 8.4 G/DL (ref 12–16)
MCH RBC QN AUTO: 33 PG (ref 27–31)
MCHC RBC AUTO-ENTMCNC: 34 G/DL (ref 31–36)
MCV RBC AUTO: 96 FL (ref 80–97)
PLATELET # BLD AUTO: 19 10^3/UL (ref 150–450)
POTASSIUM SERPL-SCNC: 4.1 MMOL/L (ref 3.5–5)
RBC # BLD AUTO: 2.55 10^6/UL (ref 4–5.4)
SODIUM SERPL-SCNC: 131 MMOL/L (ref 135–145)
WBC # BLD AUTO: 1.6 10^3/UL (ref 3.5–10.8)

## 2019-02-08 RX ADMIN — IPRATROPIUM BROMIDE AND ALBUTEROL SULFATE PRN NEB: .5; 3 SOLUTION RESPIRATORY (INHALATION) at 22:38

## 2019-02-08 RX ADMIN — METHYLPREDNISOLONE SODIUM SUCCINATE SCH MG: 40 INJECTION, POWDER, FOR SOLUTION INTRAMUSCULAR; INTRAVENOUS at 07:12

## 2019-02-08 RX ADMIN — GUAIFENESIN SCH ML: 100 SOLUTION ORAL at 17:40

## 2019-02-08 RX ADMIN — GUAIFENESIN SCH ML: 100 SOLUTION ORAL at 13:07

## 2019-02-08 RX ADMIN — IPRATROPIUM BROMIDE AND ALBUTEROL SULFATE SCH: .5; 3 SOLUTION RESPIRATORY (INHALATION) at 01:08

## 2019-02-08 RX ADMIN — PIPERACILLIN AND TAZOBACTAM SCH MLS/HR: 3; .375 INJECTION, POWDER, LYOPHILIZED, FOR SOLUTION INTRAVENOUS; PARENTERAL at 22:13

## 2019-02-08 RX ADMIN — PANTOPRAZOLE SODIUM SCH MG: 40 TABLET, DELAYED RELEASE ORAL at 09:03

## 2019-02-08 RX ADMIN — PIPERACILLIN AND TAZOBACTAM SCH MLS/HR: 3; .375 INJECTION, POWDER, LYOPHILIZED, FOR SOLUTION INTRAVENOUS; PARENTERAL at 15:31

## 2019-02-08 RX ADMIN — CALCITRIOL SCH UNITS: 1 SOLUTION ORAL at 17:41

## 2019-02-08 RX ADMIN — PIPERACILLIN AND TAZOBACTAM SCH MLS/HR: 3; .375 INJECTION, POWDER, LYOPHILIZED, FOR SOLUTION INTRAVENOUS; PARENTERAL at 05:44

## 2019-02-08 RX ADMIN — GUAIFENESIN SCH ML: 100 SOLUTION ORAL at 20:15

## 2019-02-08 RX ADMIN — LEVOTHYROXINE SODIUM SCH MCG: 137 TABLET ORAL at 05:45

## 2019-02-08 RX ADMIN — CALCITRIOL SCH: 1 SOLUTION ORAL at 09:03

## 2019-02-08 RX ADMIN — GUAIFENESIN SCH ML: 100 SOLUTION ORAL at 09:03

## 2019-02-08 NOTE — PN
Subjective


Date of Service: 02/08/19


Interval History: 





Less SOB, less cough.  Appetite fair.  





Objective


Active Medications: 








Acetaminophen (Tylenol Tab*)  650 mg PO Q4H PRN


   PRN Reason: FEVER/PAIN


Al Hydrox/Mg Hydrox/Simethicone (Maalox Plus*)  30 ml PO Q6H PRN


   PRN Reason: INDIGESTION


Albuterol (Ventolin 2.5 Mg/3 Ml Neb.Sol*)  2.5 mg INH Q2H PRN


   PRN Reason: SOB/WHEEZING


Albuterol/Ipratropium (Duoneb (Albuterol 2.5 Mg/Ipratropium 0.5 Mg))  1 neb INH 

[id number] AWAKE PRN


   PRN Reason: SOB/WHEEZING


Calcitonin [place] (Fortical(Nr))  200 units ALT NARE DAILY [facility]; Protocol


   Last Admin: 02/08/19 09:03 Dose:  Not Given


Guaifenesin (Robitussin*)  10 ml PO QID [facility]


   Last Admin: 02/08/19 13:07 Dose:  10 ml


Piperacillin Sod/Tazobactam (Sod 3.375 gm/ Sodium Chloride)  100 mls @ 25 mls/

hr IVPB Q8H [facility]


   Last Admin: 02/08/19 05:44 Dose:  25 mls/hr


Levothyroxine Sodium (Synthroid Tab*)  137 mcg PO DAILY@0600 [facility]


   Last Admin: 02/08/19 05:45 Dose:  137 mcg


Morphine Sulfate (Morphine Oral.Soln 10 Mg*)  2 mg PO Q2H PRN


   PRN Reason: Pain or work of breathing 


   Last Admin: 02/06/19 22:50 Dose:  2 mg


Ondansetron HCl (Zofran Inj*)  4 mg IV Q4H PRN


   PRN Reason: NAUSEA/VOMITING


Oxycodone/Acetaminophen (Percocet 5/325 Tab*)  1 tab PO Q4H PRN


   PRN Reason: PAIN


Pantoprazole Sodium (Protonix Tab*)  40 mg PO DAILY [facility]


   Last Admin: 02/08/19 09:03 Dose:  40 mg


Pharmacy Consult (Zosyn Per Pharmacy*)  1 note FOLLOW UP .ZOSYN PER PHARMACY [facility]


Prednisone (Deltasone Tab*)  40 mg PO ONCE ONE


   Stop: 02/08/19 13:58


Prednisone (Deltasone Tab*)  60 mg PO DAILY [facility]








 Vital Signs - 8 hr











  02/08/19 02/08/19 02/08/19





  07:26 07:31 11:53


 


Temperature 97.2 F  98.5 F


 


Pulse Rate 71  79


 


Respiratory 18 24 20





Rate   


 


Blood Pressure 122/72  125/63





(mmHg)   


 


O2 Sat by Pulse 99  99





Oximetry   











Oxygen Devices in Use Now: Nasal Cannula


Appearance: Alert, in a chair.  Tachypneic at rest, otherwise looks comfortable.


Neck: NL Appearance and Movements; NL JVP, No Thyroid Enlargement, Masses


Respiratory: Symmetrical Chest Expansion and Respiratory Effort, Clear to 

Percussion, - - Tachypneic, mild to mod wheezing BL, needs 2-3 breaths per 

sentence. 


Extremities: No Edema, No Clubbing, Cyanosis, -


Skin: No Rash or Ulcers, No Nodules or Sclerosis, -


Neurological: Alert and Oriented x 3, NL Sensation


Result Diagrams: 


 02/08/19 06:21





 02/08/19 06:21


Microbiology and Other Data: 


 Microbiology











 02/04/19 22:39 Aerobic Blood Culture - Preliminary





 Blood Venous    No Growth Day 3





 Anaerobic Blood Culture - Preliminary





    No Growth Day 3


 


 02/04/19 22:31 Aerobic Blood Culture - Preliminary





 Blood Venous    No Growth Day 3





 Anaerobic Blood Culture - Preliminary





    No Growth Day 3


 


 02/06/19 20:10 Gram Stain - Final





 Sputum Expectorated 


 


 02/05/19 06:24 Legionella Urinary Antigen - Final





 Urine    Negative Legionella Antigen





 Streptococcus pneumoniae Ag Screen - Final





    Negative S. pneumo Antigen


 


 02/05/19 01:58 Nasal Screen MRSA (PCR) - Final





 Nasal    Mrsa Not Detected


 


 02/05/19 01:58 Influenza Types A,B Antigen - Final





 Nasal    Specimen received for Influenza A/B Molecular testing














Assess/Plan/Problems-Billing


Assessment: 











- Patient Problems


(1) Hypoxia


Current Visit: Yes   Status: Acute   Code(s): R09.02 - HYPOXEMIA   SNOMED Code(s

): 257361061


   Comment: Treated for both pmonary edema and pneumonia.  IV furosemide 

completed.  Start prednsione taper 2/9.  Continue pip/hilario for now.   





(2) Hx of chronic lymphocytic leukemia


Current Visit: No   Status: Acute   Code(s): Z85.6 - PERSONAL HISTORY OF 

LEUKEMIA   SNOMED Code(s): 15092865638167


   Comment: Stable pancytopenia.  Repeat CBC 2/11.   





(3) Hyponatremia


Current Visit: No   Status: Acute   Code(s): E87.1 - HYPO-OSMOLALITY AND 

HYPONATREMIA   SNOMED Code(s): 15232952


   Comment: Improved.  BMP 2/11.   





(4) History of poliomyelitis


Current Visit: No   Status: Acute   Code(s): Z86.12 - PERSONAL HISTORY OF 

POLIOMYELITIS   SNOMED Code(s): 783138810


   Comment: Since age 3.  R leg very weak.    





(5) Hypothyroidism


Current Visit: No   Status: Acute   Code(s): E03.9 - HYPOTHYROIDISM, 

UNSPECIFIED   SNOMED Code(s): 21636294


   Comment: continue levothyroxine 137 mcg daily.  TS 2.99 on 2/4/19.

## 2019-02-09 RX ADMIN — GUAIFENESIN SCH ML: 100 SOLUTION ORAL at 20:55

## 2019-02-09 RX ADMIN — GUAIFENESIN SCH ML: 100 SOLUTION ORAL at 13:52

## 2019-02-09 RX ADMIN — GUAIFENESIN SCH ML: 100 SOLUTION ORAL at 17:05

## 2019-02-09 RX ADMIN — HEPARIN SODIUM SCH UNITS: 5000 INJECTION INTRAVENOUS; SUBCUTANEOUS at 20:56

## 2019-02-09 RX ADMIN — LEVOTHYROXINE SODIUM SCH MCG: 137 TABLET ORAL at 05:54

## 2019-02-09 RX ADMIN — PIPERACILLIN AND TAZOBACTAM SCH MLS/HR: 3; .375 INJECTION, POWDER, LYOPHILIZED, FOR SOLUTION INTRAVENOUS; PARENTERAL at 22:18

## 2019-02-09 RX ADMIN — PIPERACILLIN AND TAZOBACTAM SCH MLS/HR: 3; .375 INJECTION, POWDER, LYOPHILIZED, FOR SOLUTION INTRAVENOUS; PARENTERAL at 05:54

## 2019-02-09 RX ADMIN — PANTOPRAZOLE SODIUM SCH MG: 40 TABLET, DELAYED RELEASE ORAL at 08:23

## 2019-02-09 RX ADMIN — PIPERACILLIN AND TAZOBACTAM SCH MLS/HR: 3; .375 INJECTION, POWDER, LYOPHILIZED, FOR SOLUTION INTRAVENOUS; PARENTERAL at 13:52

## 2019-02-09 RX ADMIN — PREDNISONE SCH MG: 20 TABLET ORAL at 08:23

## 2019-02-09 RX ADMIN — CALCITRIOL SCH UNITS: 1 SOLUTION ORAL at 08:27

## 2019-02-09 RX ADMIN — GUAIFENESIN SCH ML: 100 SOLUTION ORAL at 08:25

## 2019-02-09 NOTE — PN
Subjective


Date of Service: 02/09/19


Interval History: 


Pt seen and examined.  Meds and labs reviewed.  





CC: Improved SOB





ROS:  Denied HA/dizziness, F/C, N/V, CP, increased cough, sputum production, 

abd pain, diarrhea, constipation, dysuria, myalgias, arthralgias, throat pain, 

and new skin lesions.  The rest of the 14 point ROS are unremarkable.





PHYSICAL EXAM:


GEN APPEARANCE: Awake, not in acute distress, obese


HEENT: NC/AT, PERRLA, moist oral mucosa, (-) throat erythema


NECK: Soft, supple, (-) cervical LAD


HEART: S1S2 WNL, RRR, No MRG


CHEST: GAE, No R/R, (+)Wheezing


ABD: Soft, ND/NT, NABS 4x Q


EXT: No C/C/E


SKIN: Warm to touch; 2 dime size open areas of R. buttock


PSYCH: No active psychosis, hallucinations, depression, SI/HI





Objective


Active Medications: 








Acetaminophen (Tylenol Tab*)  650 mg PO Q4H PRN


   PRN Reason: FEVER/PAIN


Al Hydrox/Mg Hydrox/Simethicone (Maalox Plus*)  30 ml PO Q6H PRN


   PRN Reason: INDIGESTION


Albuterol (Ventolin 2.5 Mg/3 Ml Neb.Sol*)  2.5 mg INH Q2H PRN


   PRN Reason: SOB/WHEEZING


Albuterol/Ipratropium (Duoneb (Albuterol 2.5 Mg/Ipratropium 0.5 Mg))  1 neb INH 

[id number] AWAKE PRN


   PRN Reason: SOB/WHEEZING


   Last Admin: 02/08/19 22:38 Dose:  1 neb


Calcitonin [place] (Fortical(Nr))  200 units ALT NARE DAILY [facility]; Protocol


   Last Admin: 02/09/19 08:27 Dose:  200 units


Guaifenesin (Robitussin*)  10 ml PO QID [facility]


   Last Admin: 02/09/19 13:52 Dose:  10 ml


Heparin Sodium (Porcine) (Heparin Vial(*))  5,000 units SUBCUT Q12HR [facility]


Piperacillin Sod/Tazobactam (Sod 3.375 gm/ Sodium Chloride)  100 mls @ 25 mls/

hr IVPB Q8H [facility]


   Last Admin: 02/09/19 13:52 Dose:  25 mls/hr


Levothyroxine Sodium (Synthroid Tab*)  137 mcg PO DAILY@0600 Counts include 234 beds at the [facility]


   Last Admin: 02/09/19 05:54 Dose:  137 mcg


Morphine Sulfate (Morphine Oral.Soln 10 Mg*)  2 mg PO Q2H PRN


   PRN Reason: Pain or work of breathing 


   Last Admin: 02/06/19 22:50 Dose:  2 mg


Ondansetron HCl (Zofran Inj*)  4 mg IV Q4H PRN


   PRN Reason: NAUSEA/VOMITING


Oxycodone/Acetaminophen (Percocet 5/325 Tab*)  1 tab PO Q4H PRN


   PRN Reason: PAIN


Pantoprazole Sodium (Protonix Tab*)  40 mg PO DAILY Counts include 234 beds at the [facility]


   Last Admin: 02/09/19 08:23 Dose:  40 mg


Pharmacy Consult (Zosyn Per Pharmacy*)  1 note FOLLOW UP .ZOSYN PER PHARMACY Counts include 234 beds at the [facility]


Prednisone (Deltasone Tab*)  60 mg PO DAILY Counts include 234 beds at the [facility]


   Last Admin: 02/09/19 08:23 Dose:  60 mg








 Vital Signs - 8 hr











  02/09/19 02/09/19 02/09/19





  07:13 08:43 11:23


 


Temperature 97.6 F  97.8 F


 


Pulse Rate 70  75


 


Respiratory 24 24 28





Rate   


 


Blood Pressure 123/62  119/62





(mmHg)   


 


O2 Sat by Pulse 98  98





Oximetry   











Oxygen Devices in Use Now: High Flow Nasal Cannula


Result Diagrams: 


 02/08/19 06:21





 02/08/19 06:21


Microbiology and Other Data: 


 Microbiology











 02/04/19 22:39 Aerobic Blood Culture - Preliminary





 Blood Venous    No Growth Day 3





 Anaerobic Blood Culture - Preliminary





    No Growth Day 3


 


 02/04/19 22:31 Aerobic Blood Culture - Preliminary





 Blood Venous    No Growth Day 3





 Anaerobic Blood Culture - Preliminary





    No Growth Day 3


 


 02/06/19 20:10 Gram Stain - Final





 Sputum Expectorated 


 


 02/05/19 06:24 Legionella Urinary Antigen - Final





 Urine    Negative Legionella Antigen





 Streptococcus pneumoniae Ag Screen - Final





    Negative S. pneumo Antigen


 


 02/05/19 01:58 Nasal Screen MRSA (PCR) - Final





 Nasal    Mrsa Not Detected


 


 02/05/19 01:58 Influenza Types A,B Antigen - Final





 Nasal    Specimen received for Influenza A/B Molecular testing














Assess/Plan/Problems-Billing


Assessment: 











- Patient Problems


(1) Hypoxia


Current Visit: Yes   Status: Acute   Code(s): R09.02 - HYPOXEMIA   SNOMED Code(s

): 554341177


   Comment: 


-Thought to be due to both fluid overload and PNA


-Mentions she is not on O2 at home


-Continue Zosyn and Prednisone and currently not on diuretics


-Will repeat CXR in AM


-Repeat BNP in AM


-Continue nebs   





(2) Buttock wound


Current Visit: Yes   Status: Acute   Code(s): S31.809A - UNSPECIFIED OPEN WOUND 

OF UNSPECIFIED BUTTOCK, INIT ENCNTR   SNOMED Code(s): 24852313


   Comment: 


-Continue local wound care


-Wound-care consult pending   





(3) CLL (chronic lymphocytic leukemia)


Current Visit: Yes   Status: Acute   Code(s): C91.90 - LYMPHOID LEUKEMIA, 

UNSPECIFIED NOT HAVING ACHIEVED REMISSION   SNOMED Code(s): 86195626


   Comment: 


-Stable


-Defer w/Oncologist F/U as outpt   





(4) Hyponatremia


Current Visit: No   Status: Acute   Code(s): E87.1 - HYPO-OSMOLALITY AND 

HYPONATREMIA   SNOMED Code(s): 94066342


   Comment: 


-Continues to improve   





(5) History of poliomyelitis


Current Visit: No   Status: Acute   Code(s): Z86.12 - PERSONAL HISTORY OF 

POLIOMYELITIS   SNOMED Code(s): 825188113


   Comment: 


-R. leg weakness   





(6) Hypothyroidism


Current Visit: No   Status: Acute   Code(s): E03.9 - HYPOTHYROIDISM, 

UNSPECIFIED   SNOMED Code(s): 91564993


   Comment: 


-Continue Levothyroxine


-TSH WNL


   





(7) DVT prophylaxis


Current Visit: Yes   Status: Acute   Code(s): REH9427 -    SNOMED Code(s): 

499197740


   Comment: 


-Continue SCDs


-Unclear why pt not on pharmacologic proph, thus will place pt on Heparin q12H 

  


Status and Disposition: 


-As above


-For PT eval

## 2019-02-10 LAB
ALBUMIN SERPL BCG-MCNC: 2.8 G/DL (ref 3.2–5.2)
ALBUMIN/GLOB SERPL: 1.6 {RATIO} (ref 1–3)
ALP SERPL-CCNC: 23 U/L (ref 34–104)
ALT SERPL W P-5'-P-CCNC: 31 U/L (ref 7–52)
ANION GAP SERPL CALC-SCNC: 7 MMOL/L (ref 2–11)
AST SERPL-CCNC: 26 U/L (ref 13–39)
BUN SERPL-MCNC: 16 MG/DL (ref 6–24)
BUN/CREAT SERPL: 42.1 (ref 8–20)
CALCIUM SERPL-MCNC: 8 MG/DL (ref 8.6–10.3)
CHLORIDE SERPL-SCNC: 101 MMOL/L (ref 101–111)
GLOBULIN SER CALC-MCNC: 1.8 G/DL (ref 2–4)
GLUCOSE SERPL-MCNC: 90 MG/DL (ref 70–100)
HCO3 SERPL-SCNC: 24 MMOL/L (ref 22–32)
HCT VFR BLD AUTO: 26 % (ref 35–47)
HGB BLD-MCNC: 8.8 G/DL (ref 12–16)
MAGNESIUM SERPL-MCNC: 2 MG/DL (ref 1.9–2.7)
MCH RBC QN AUTO: 33 PG (ref 27–31)
MCHC RBC AUTO-ENTMCNC: 34 G/DL (ref 31–36)
MCV RBC AUTO: 96 FL (ref 80–97)
PLATELET # BLD AUTO: 18 10^3/UL (ref 150–450)
POTASSIUM SERPL-SCNC: 3.9 MMOL/L (ref 3.5–5)
PROT SERPL-MCNC: 4.6 G/DL (ref 6.4–8.9)
RBC # BLD AUTO: 2.68 10^6/UL (ref 4–5.4)
SODIUM SERPL-SCNC: 132 MMOL/L (ref 135–145)
WBC # BLD AUTO: 1.9 10^3/UL (ref 3.5–10.8)

## 2019-02-10 RX ADMIN — GUAIFENESIN SCH ML: 100 SOLUTION ORAL at 10:14

## 2019-02-10 RX ADMIN — MORPHINE SULFATE PRN MG: 10 SOLUTION ORAL at 14:12

## 2019-02-10 RX ADMIN — FUROSEMIDE SCH MG: 10 INJECTION, SOLUTION INTRAMUSCULAR; INTRAVENOUS at 21:11

## 2019-02-10 RX ADMIN — METOLAZONE SCH MG: 5 TABLET ORAL at 14:12

## 2019-02-10 RX ADMIN — MORPHINE SULFATE PRN MG: 10 SOLUTION ORAL at 17:00

## 2019-02-10 RX ADMIN — FUROSEMIDE SCH MG: 10 INJECTION, SOLUTION INTRAMUSCULAR; INTRAVENOUS at 11:48

## 2019-02-10 RX ADMIN — PIPERACILLIN AND TAZOBACTAM SCH MLS/HR: 3; .375 INJECTION, POWDER, LYOPHILIZED, FOR SOLUTION INTRAVENOUS; PARENTERAL at 05:44

## 2019-02-10 RX ADMIN — MORPHINE SULFATE PRN MG: 10 SOLUTION ORAL at 11:49

## 2019-02-10 RX ADMIN — PANTOPRAZOLE SODIUM SCH MG: 40 TABLET, DELAYED RELEASE ORAL at 10:15

## 2019-02-10 RX ADMIN — PIPERACILLIN AND TAZOBACTAM SCH MLS/HR: 3; .375 INJECTION, POWDER, LYOPHILIZED, FOR SOLUTION INTRAVENOUS; PARENTERAL at 22:09

## 2019-02-10 RX ADMIN — LEVOTHYROXINE SODIUM SCH MCG: 137 TABLET ORAL at 05:45

## 2019-02-10 RX ADMIN — PREDNISONE SCH MG: 20 TABLET ORAL at 10:14

## 2019-02-10 RX ADMIN — GUAIFENESIN SCH ML: 100 SOLUTION ORAL at 14:12

## 2019-02-10 RX ADMIN — DOXYCYCLINE SCH MLS/HR: 100 INJECTION, POWDER, LYOPHILIZED, FOR SOLUTION INTRAVENOUS at 15:17

## 2019-02-10 RX ADMIN — IPRATROPIUM BROMIDE AND ALBUTEROL SULFATE PRN NEB: .5; 3 SOLUTION RESPIRATORY (INHALATION) at 13:26

## 2019-02-10 RX ADMIN — HEPARIN SODIUM SCH: 5000 INJECTION INTRAVENOUS; SUBCUTANEOUS at 09:16

## 2019-02-10 RX ADMIN — IPRATROPIUM BROMIDE AND ALBUTEROL SULFATE PRN NEB: .5; 3 SOLUTION RESPIRATORY (INHALATION) at 09:43

## 2019-02-10 RX ADMIN — GUAIFENESIN SCH ML: 100 SOLUTION ORAL at 17:00

## 2019-02-10 RX ADMIN — CALCITRIOL SCH UNITS: 1 SOLUTION ORAL at 10:14

## 2019-02-10 RX ADMIN — MORPHINE SULFATE PRN MG: 10 SOLUTION ORAL at 21:10

## 2019-02-10 RX ADMIN — PIPERACILLIN AND TAZOBACTAM SCH MLS/HR: 3; .375 INJECTION, POWDER, LYOPHILIZED, FOR SOLUTION INTRAVENOUS; PARENTERAL at 14:12

## 2019-02-10 RX ADMIN — GUAIFENESIN SCH ML: 100 SOLUTION ORAL at 21:11

## 2019-02-10 NOTE — PN
Subjective


Date of Service: 02/10/19


Interval History: 


Pt seen and examined.  Meds and labs reviewed.  





CC: N/A





ROS:  Mentions shes ok despite the fact she appears to have increased WOB.  Pt 

maybe downplaying her symptoms





PHYSICAL EXAM:


GEN APPEARANCE: Awake, appears to have increased WOB, tachypnic, on 5L NC


HEENT: NC/AT, PERRLA, moist oral mucosa, (-) throat erythema


NECK: Soft, supple, (-) cervical LAD, (+)JVD, (+)HJR


HEART: S1S2 WNL, RRR, No MRG


CHEST: (+)Wheezing and ronchi,  GAE


ABD: Soft, ND/NT, NABS 4x Q


EXT: No C/C/E


SKIN: Warm to touch


PSYCH: No active psychosis, hallucinations, depression, SI/HI





Objective


Active Medications: 








Acetaminophen (Tylenol Tab*)  650 mg PO Q4H PRN


   PRN Reason: FEVER/PAIN


Al Hydrox/Mg Hydrox/Simethicone (Maalox Plus*)  30 ml PO Q6H PRN


   PRN Reason: INDIGESTION


Albuterol (Ventolin 2.5 Mg/3 Ml Neb.Sol*)  2.5 mg INH Q2H PRN


   PRN Reason: SOB/WHEEZING


Albuterol/Ipratropium (Duoneb (Albuterol 2.5 Mg/Ipratropium 0.5 Mg))  1 neb INH 

[id number] AWAKE PRN


   PRN Reason: SOB/WHEEZING


   Last Admin: 02/10/19 13:26 Dose:  1 neb


Calcitonin [place] (Fortical(Nr))  200 units ALT NARE DAILY [facility]; Protocol


   Last Admin: 02/10/19 10:14 Dose:  200 units


Furosemide (Lasix Iv*)  40 mg IV BID [facility]


   Last Admin: 02/10/19 11:48 Dose:  40 mg


Guaifenesin (Robitussin*)  10 ml PO QID [facility]


   Last Admin: 02/10/19 10:14 Dose:  10 ml


Piperacillin Sod/Tazobactam (Sod 3.375 gm/ Sodium Chloride)  100 mls @ 25 mls/

hr IVPB Q8H [facility]


   Last Admin: 02/10/19 05:44 Dose:  25 mls/hr


Sodium Phosphate 10 mmole/ (Sodium Chloride)  253.3333 mls @ 42 mls/hr IVPB 

ONCE ONE


   Stop: 02/10/19 15:31


   Last Admin: 02/10/19 10:14 Dose:  42 mls/hr


Doxycycline Hyclate 100 mg/ (Sodium Chloride)  250 mls @ 250 mls/hr IVPB Q12H 

[facility]


   Stop: 02/15/19 14:29


Levothyroxine Sodium (Synthroid Tab*)  137 mcg PO DAILY@0600 [facility]


   Last Admin: 02/10/19 05:45 Dose:  137 mcg


Metolazone (Zaroxolyn Tab*)  5 mg PO DAILY@0830 [facility]


Morphine Sulfate (Morphine Oral.Soln 10 Mg*)  2 mg PO Q2H PRN


   PRN Reason: Pain or work of breathing 


   Last Admin: 02/10/19 11:49 Dose:  2 mg


Ondansetron HCl (Zofran Inj*)  4 mg IV Q4H PRN


   PRN Reason: NAUSEA/VOMITING


Oxycodone/Acetaminophen (Percocet 5/325 Tab*)  1 tab PO Q4H PRN


   PRN Reason: PAIN


Pantoprazole Sodium (Protonix Tab*)  40 mg PO DAILY [facility]


   Last Admin: 02/10/19 10:15 Dose:  40 mg


Prednisone (Deltasone Tab*)  50 mg PO DAILY [facility]








 Vital Signs - 8 hr











  02/10/19 02/10/19 02/10/19





  07:38 08:00 09:45


 


Temperature 97.7 F  


 


Pulse Rate 74  70


 


Respiratory 26 30 30





Rate   


 


Blood Pressure 135/62  





(mmHg)   


 


O2 Sat by Pulse 97  





Oximetry   














  02/10/19 02/10/19 02/10/19





  10:47 11:49 13:28


 


Temperature 98.4 F  


 


Pulse Rate 84  92


 


Respiratory 30 30 30





Rate   


 


Blood Pressure 138/67  





(mmHg)   


 


O2 Sat by Pulse 95  





Oximetry   











Oxygen Devices in Use Now: Nasal Cannula


Result Diagrams: 


 02/10/19 05:55





 02/10/19 05:55


Microbiology and Other Data: 


 Microbiology











 02/04/19 22:39 Aerobic Blood Culture - Preliminary





 Blood Venous    No Growth Day 3





 Anaerobic Blood Culture - Preliminary





    No Growth Day 3


 


 02/04/19 22:31 Aerobic Blood Culture - Preliminary





 Blood Venous    No Growth Day 3





 Anaerobic Blood Culture - Preliminary





    No Growth Day 3


 


 02/06/19 20:10 Gram Stain - Final





 Sputum Expectorated 


 


 02/05/19 06:24 Legionella Urinary Antigen - Final





 Urine    Negative Legionella Antigen





 Streptococcus pneumoniae Ag Screen - Final





    Negative S. pneumo Antigen


 


 02/05/19 01:58 Nasal Screen MRSA (PCR) - Final





 Nasal    Mrsa Not Detected


 


 02/05/19 01:58 Influenza Types A,B Antigen - Final





 Nasal    Specimen received for Influenza A/B Molecular testing














Assess/Plan/Problems-Billing


Assessment: 











- Patient Problems


(1) Diastolic CHF


Current Visit: Yes   Status: Acute   Code(s): I50.30 - UNSPECIFIED DIASTOLIC (

CONGESTIVE) HEART FAILURE   SNOMED Code(s): 767821627


   Comment: 


-Pts clinical picture consistent with diastolic CHF as initially suspected (

along HCAP)


-2D echo (02/05/19): No wall motion abnormalities, mild MR and TR, EF =50-55%


-Will place on IV Lasix BID and Metolazone   





(2) HCAP (healthcare-associated pneumonia)


Current Visit: Yes   Status: Acute   Code(s): J18.9 - PNEUMONIA, UNSPECIFIED 

ORGANISM   SNOMED Code(s): 022746728


   Comment: 


-Cultures had been negative


-On day #5/10 of Zosyn


-Given above data, will D/C Zosyn and place pt on Doxycycline for 5 more days 

to complete 10 day treatment therapy   





(3) Buttock wound


Current Visit: Yes   Status: Acute   Code(s): S31.809A - UNSPECIFIED OPEN WOUND 

OF UNSPECIFIED BUTTOCK, INIT ENCNTR   SNOMED Code(s): 36078271


   Comment: 


-Continue local wound care


-Wound-care consult pending   





(4) CLL (chronic lymphocytic leukemia)


Current Visit: Yes   Status: Acute   Code(s): C91.90 - LYMPHOID LEUKEMIA, 

UNSPECIFIED NOT HAVING ACHIEVED REMISSION   SNOMED Code(s): 73614723


   Comment: 


-Stable


-Likely cause of pancytopenia


-Defer w/Oncologist F/U as outpt      





(5) Hyponatremia


Current Visit: No   Status: Acute   Code(s): E87.1 - HYPO-OSMOLALITY AND 

HYPONATREMIA   SNOMED Code(s): 37374827


   Comment: 


#Hypervolemic hyponatremia


-Continues to improve   


-Placed on diuretics as described


   





(6) History of poliomyelitis


Current Visit: No   Status: Acute   Code(s): Z86.12 - PERSONAL HISTORY OF 

POLIOMYELITIS   SNOMED Code(s): 617153093


   Comment: 


-R. leg weakness   





(7) Hypothyroidism


Current Visit: No   Status: Acute   Code(s): E03.9 - HYPOTHYROIDISM, 

UNSPECIFIED   SNOMED Code(s): 67929107


   Comment: 


-Continue Levothyroxine


-TSH WNL


   





(8) DVT prophylaxis


Current Visit: Yes   Status: Acute   Code(s): OZE7559 -    SNOMED Code(s): 

094510743


   Comment: 


-Continue SCDs


-D/Cd Heparin as previous described due to severe thrombocytopenia likely due 

to pancytopenia due to CLL   


Status and Disposition: 


-As above


-Awaiting PT eval

## 2019-02-11 LAB
ALBUMIN SERPL BCG-MCNC: 3.1 G/DL (ref 3.2–5.2)
ALBUMIN/GLOB SERPL: 1.6 {RATIO} (ref 1–3)
ALP SERPL-CCNC: 24 U/L (ref 34–104)
ALT SERPL W P-5'-P-CCNC: 30 U/L (ref 7–52)
ANION GAP SERPL CALC-SCNC: 8 MMOL/L (ref 2–11)
AST SERPL-CCNC: 28 U/L (ref 13–39)
BUN SERPL-MCNC: 15 MG/DL (ref 6–24)
BUN/CREAT SERPL: 28.8 (ref 8–20)
CALCIUM SERPL-MCNC: 8.4 MG/DL (ref 8.6–10.3)
CHLORIDE SERPL-SCNC: 94 MMOL/L (ref 101–111)
GLOBULIN SER CALC-MCNC: 2 G/DL (ref 2–4)
GLUCOSE SERPL-MCNC: 92 MG/DL (ref 70–100)
HCO3 SERPL-SCNC: 32 MMOL/L (ref 22–32)
HCT VFR BLD AUTO: 26 % (ref 35–47)
HGB BLD-MCNC: 9.1 G/DL (ref 12–16)
MAGNESIUM SERPL-MCNC: 1.8 MG/DL (ref 1.9–2.7)
MCH RBC QN AUTO: 33 PG (ref 27–31)
MCHC RBC AUTO-ENTMCNC: 35 G/DL (ref 31–36)
MCV RBC AUTO: 96 FL (ref 80–97)
PLATELET # BLD AUTO: 18 10^3/UL (ref 150–450)
POTASSIUM SERPL-SCNC: 3.3 MMOL/L (ref 3.5–5)
PROT SERPL-MCNC: 5.1 G/DL (ref 6.4–8.9)
RBC # BLD AUTO: 2.73 10^6/UL (ref 4–5.4)
SODIUM SERPL-SCNC: 134 MMOL/L (ref 135–145)
WBC # BLD AUTO: 1.7 10^3/UL (ref 3.5–10.8)

## 2019-02-11 RX ADMIN — GUAIFENESIN SCH ML: 100 SOLUTION ORAL at 21:05

## 2019-02-11 RX ADMIN — Medication SCH DROP: at 15:07

## 2019-02-11 RX ADMIN — MORPHINE SULFATE PRN MG: 10 SOLUTION ORAL at 05:10

## 2019-02-11 RX ADMIN — DOXYCYCLINE SCH MLS/HR: 100 INJECTION, POWDER, LYOPHILIZED, FOR SOLUTION INTRAVENOUS at 15:07

## 2019-02-11 RX ADMIN — PANTOPRAZOLE SODIUM SCH MG: 40 TABLET, DELAYED RELEASE ORAL at 10:21

## 2019-02-11 RX ADMIN — IPRATROPIUM BROMIDE AND ALBUTEROL SULFATE PRN NEB: .5; 3 SOLUTION RESPIRATORY (INHALATION) at 10:49

## 2019-02-11 RX ADMIN — PIPERACILLIN AND TAZOBACTAM SCH MLS/HR: 3; .375 INJECTION, POWDER, LYOPHILIZED, FOR SOLUTION INTRAVENOUS; PARENTERAL at 23:04

## 2019-02-11 RX ADMIN — MORPHINE SULFATE PRN MG: 10 SOLUTION ORAL at 00:10

## 2019-02-11 RX ADMIN — GUAIFENESIN SCH ML: 100 SOLUTION ORAL at 10:21

## 2019-02-11 RX ADMIN — Medication SCH DROP: at 21:05

## 2019-02-11 RX ADMIN — PIPERACILLIN AND TAZOBACTAM SCH MLS/HR: 3; .375 INJECTION, POWDER, LYOPHILIZED, FOR SOLUTION INTRAVENOUS; PARENTERAL at 15:06

## 2019-02-11 RX ADMIN — FUROSEMIDE SCH MG: 10 INJECTION, SOLUTION INTRAMUSCULAR; INTRAVENOUS at 10:20

## 2019-02-11 RX ADMIN — FUROSEMIDE SCH MG: 10 INJECTION, SOLUTION INTRAMUSCULAR; INTRAVENOUS at 21:05

## 2019-02-11 RX ADMIN — MORPHINE SULFATE PRN MG: 10 SOLUTION ORAL at 10:19

## 2019-02-11 RX ADMIN — METOLAZONE SCH MG: 5 TABLET ORAL at 07:39

## 2019-02-11 RX ADMIN — PREDNISONE SCH MG: 50 TABLET ORAL at 10:21

## 2019-02-11 RX ADMIN — PIPERACILLIN AND TAZOBACTAM SCH MLS/HR: 3; .375 INJECTION, POWDER, LYOPHILIZED, FOR SOLUTION INTRAVENOUS; PARENTERAL at 06:09

## 2019-02-11 RX ADMIN — LEVOTHYROXINE SODIUM SCH MCG: 137 TABLET ORAL at 05:10

## 2019-02-11 RX ADMIN — GUAIFENESIN SCH ML: 100 SOLUTION ORAL at 18:11

## 2019-02-11 RX ADMIN — CALCITRIOL SCH UNITS: 1 SOLUTION ORAL at 10:21

## 2019-02-11 RX ADMIN — GUAIFENESIN SCH ML: 100 SOLUTION ORAL at 15:07

## 2019-02-11 RX ADMIN — DOXYCYCLINE SCH MLS/HR: 100 INJECTION, POWDER, LYOPHILIZED, FOR SOLUTION INTRAVENOUS at 02:22

## 2019-02-11 NOTE — PN
Subjective


Date of Service: 02/11/19


Interval History: 


Pt seen and examined.  Meds and labs reviewed.  





CC:  SOB improving but still at 5L this AM; complains of hearing difficulties 

in the past few weeks.





ROS:  Denied HA/dizziness, F/C, N/V, CP, SOB, increased cough, sputum production

, abd pain, diarrhea, constipation, dysuria, myalgias, arthralgias, throat pain

, and new skin lesions.  The rest of the 14 point ROS are unremarkable.





PHYSICAL EXAM:


GEN APPEARANCE: Awake, not in acute distress


HEENT: NC/AT, PERRLA, moist oral mucosa, (-) throat erythema; (-) tenderness of 

tragus and pinnae, on otoscopic exam, (+)evidence of BL impacted cerumen, 

tympanic membrane not visible due to cerumen impaction


NECK: Soft, supple, (-) cervical LAD, (+)JVD, (+)HJR, both improving


HEART: S1S2 WNL, RRR, No MRG


CHEST: CTA, BL, GAE, No W/R/(+)ronchi and occasional wheezing


ABD: Soft, ND/NT, NABS 4x Q


EXT: No C/C/E


SKIN: Warm to touch


PSYCH: No active psychosis, hallucinations, depression, SI/HI





Objective


Active Medications: 








Acetaminophen (Tylenol Tab*)  650 mg PO Q4H PRN


   PRN Reason: FEVER/PAIN


Al Hydrox/Mg Hydrox/Simethicone (Maalox Plus*)  30 ml PO Q6H PRN


   PRN Reason: INDIGESTION


Albuterol (Ventolin 2.5 Mg/3 Ml Neb.Sol*)  2.5 mg INH Q2H PRN


   PRN Reason: SOB/WHEEZING


Albuterol/Ipratropium (Duoneb (Albuterol 2.5 Mg/Ipratropium 0.5 Mg))  1 neb INH 

[id number] AWAKE PRN


   PRN Reason: SOB/WHEEZING


   Last Admin: 02/11/19 10:49 Dose:  1 neb


Calcitonin [place] (Fortical(Nr))  200 units ALT NARE DAILY [facility]; Protocol


   Last Admin: 02/11/19 10:21 Dose:  200 units


Carbamide Peroxide (Debrox 6.5% Otic*)  10 drop BOTH EARS BID [facility]


   Stop: 02/15/19 11:59


   Last Admin: 02/11/19 15:07 Dose:  10 drop


Furosemide (Lasix Iv*)  40 mg IV BID [facility]


   Last Admin: 02/11/19 10:20 Dose:  40 mg


Guaifenesin (Robitussin*)  10 ml PO QID [facility]


   Last Admin: 02/11/19 15:07 Dose:  10 ml


Piperacillin Sod/Tazobactam (Sod 3.375 gm/ Sodium Chloride)  100 mls @ 25 mls/

hr IVPB Q8H [facility]


   Last Admin: 02/11/19 15:06 Dose:  25 mls/hr


Doxycycline Hyclate 100 mg/ (Sodium Chloride)  250 mls @ 250 mls/hr IVPB Q12H 

[facility]


   Stop: 02/15/19 14:29


   Last Admin: 02/11/19 15:07 Dose:  250 mls/hr


Levothyroxine Sodium (Synthroid Tab*)  137 mcg PO DAILY@0600 [facility]


   Last Admin: 02/11/19 05:10 Dose:  137 mcg


Metolazone (Zaroxolyn Tab*)  5 mg PO DAILY@0830 [facility]


   Last Admin: 02/11/19 07:39 Dose:  5 mg


Morphine Sulfate (Morphine Oral.Soln 10 Mg*)  2 mg PO Q2H PRN


   PRN Reason: Pain or work of breathing 


   Last Admin: 02/11/19 10:19 Dose:  2 mg


Ondansetron HCl (Zofran Inj*)  4 mg IV Q4H PRN


   PRN Reason: NAUSEA/VOMITING


Oxycodone/Acetaminophen (Percocet 5/325 Tab*)  1 tab PO Q4H PRN


   PRN Reason: PAIN


Pantoprazole Sodium (Protonix Tab*)  40 mg PO DAILY [facility]


   Last Admin: 02/11/19 10:21 Dose:  40 mg


Prednisone (Deltasone Tab*)  50 mg PO DAILY [facility]


   Last Admin: 02/11/19 10:21 Dose:  50 mg








 Vital Signs - 8 hr











  02/11/19 02/11/19 02/11/19





  10:19 10:50 11:58


 


Temperature   97.7 F


 


Pulse Rate  84 84


 


Respiratory 28 17 22





Rate   


 


Blood Pressure   103/47





(mmHg)   


 


O2 Sat by Pulse  94 93





Oximetry   














  02/11/19 02/11/19





  16:19 16:20


 


Temperature  


 


Pulse Rate  


 


Respiratory 28 26





Rate  


 


Blood Pressure  





(mmHg)  


 


O2 Sat by Pulse  





Oximetry  











Oxygen Devices in Use Now: Nasal Cannula


Result Diagrams: 


 02/11/19 06:37





 02/11/19 06:37


Microbiology and Other Data: 


 Microbiology











 02/04/19 22:39 Aerobic Blood Culture - Preliminary





 Blood Venous    No Growth Day 3





 Anaerobic Blood Culture - Preliminary





    No Growth Day 3


 


 02/04/19 22:31 Aerobic Blood Culture - Preliminary





 Blood Venous    No Growth Day 3





 Anaerobic Blood Culture - Preliminary





    No Growth Day 3


 


 02/06/19 20:10 Gram Stain - Final





 Sputum Expectorated 


 


 02/05/19 06:24 Legionella Urinary Antigen - Final





 Urine    Negative Legionella Antigen





 Streptococcus pneumoniae Ag Screen - Final





    Negative S. pneumo Antigen


 


 02/05/19 01:58 Nasal Screen MRSA (PCR) - Final





 Nasal    Mrsa Not Detected


 


 02/05/19 01:58 Influenza Types A,B Antigen - Final





 Nasal    Specimen received for Influenza A/B Molecular testing














Assess/Plan/Problems-Billing


Assessment: 











- Patient Problems


(1) Diastolic CHF


Current Visit: Yes   Status: Acute   Code(s): I50.30 - UNSPECIFIED DIASTOLIC (

CONGESTIVE) HEART FAILURE   SNOMED Code(s): 012701946


   Comment: 


-Slowly improving


-Pts clinical picture consistent with diastolic CHF as initially suspected (

along HCAP)


-2D echo (02/05/19): No wall motion abnormalities, mild MR and TR, EF =50-55%


-Continue  IV Lasix BID and Metolazone      





(2) HCAP (healthcare-associated pneumonia)


Current Visit: Yes   Status: Acute   Code(s): J18.9 - PNEUMONIA, UNSPECIFIED 

ORGANISM   SNOMED Code(s): 523760427


   Comment: 


-For repeat CXR in AM


-Cultures had been negative


-Continue Doxycycline for 4 more days to complete 10 day treatment therapy      





(3) Impacted cerumen


Current Visit: Yes   Status: Acute   Code(s): H61.20 - IMPACTED CERUMEN, 

UNSPECIFIED EAR   SNOMED Code(s): 31910439


   Comment: 


-Placed pt on Debrox


-Likely cause of hearing difficulties she has had in the past few weeks


-Will continue watchful waiting   





(4) Buttock wound


Current Visit: Yes   Status: Acute   Code(s): S31.809A - UNSPECIFIED OPEN WOUND 

OF UNSPECIFIED BUTTOCK, INIT ENCNTR   SNOMED Code(s): 63873699


   Comment: 


-Continue local wound care


-Defer any further input from wound care service      





(5) CLL (chronic lymphocytic leukemia)


Current Visit: Yes   Status: Acute   Code(s): C91.90 - LYMPHOID LEUKEMIA, 

UNSPECIFIED NOT HAVING ACHIEVED REMISSION   SNOMED Code(s): 07398310


   Comment: 


-Stable


-Likely cause of pancytopenia


-Defer w/Oncologist F/U as outpt      





(6) Hyponatremia


Current Visit: No   Status: Acute   Code(s): E87.1 - HYPO-OSMOLALITY AND 

HYPONATREMIA   SNOMED Code(s): 07822701


   Comment: 


#Hypervolemic hyponatremia


-Continues to improve w/diuresis   





   





(7) History of poliomyelitis


Current Visit: No   Status: Acute   Code(s): Z86.12 - PERSONAL HISTORY OF 

POLIOMYELITIS   SNOMED Code(s): 704024334


   Comment: 


-R. leg weakness   





(8) Hypothyroidism


Current Visit: No   Status: Acute   Code(s): E03.9 - HYPOTHYROIDISM, 

UNSPECIFIED   SNOMED Code(s): 70065292


   Comment: 


-Continue Levothyroxine


-TSH WNL


   





(9) DVT prophylaxis


Current Visit: Yes   Status: Acute   Code(s): MNX7731 -    SNOMED Code(s): 

201178399


   Comment: 


-Continue SCDs


-D/Cd Heparin as previous described due to severe thrombocytopenia likely due 

to pancytopenia due to CLL   


Status and Disposition: 


-Appreciate PT input


-Pt would likely need ABBIE placement

## 2019-02-12 LAB
ANION GAP SERPL CALC-SCNC: 8 MMOL/L (ref 2–11)
BUN SERPL-MCNC: 20 MG/DL (ref 6–24)
BUN/CREAT SERPL: 35.7 (ref 8–20)
CALCIUM SERPL-MCNC: 8.6 MG/DL (ref 8.6–10.3)
CHLORIDE SERPL-SCNC: 90 MMOL/L (ref 101–111)
GLUCOSE SERPL-MCNC: 106 MG/DL (ref 70–100)
HCO3 SERPL-SCNC: 36 MMOL/L (ref 22–32)
HCT VFR BLD AUTO: 24 % (ref 35–47)
HGB BLD-MCNC: 8.4 G/DL (ref 12–16)
MAGNESIUM SERPL-MCNC: 2 MG/DL (ref 1.9–2.7)
MCH RBC QN AUTO: 33 PG (ref 27–31)
MCHC RBC AUTO-ENTMCNC: 35 G/DL (ref 31–36)
MCV RBC AUTO: 95 FL (ref 80–97)
PLATELET # BLD AUTO: 15 10^3/UL (ref 150–450)
POTASSIUM SERPL-SCNC: 2.8 MMOL/L (ref 3.5–5)
RBC # BLD AUTO: 2.55 10^6/UL (ref 4–5.4)
SODIUM SERPL-SCNC: 134 MMOL/L (ref 135–145)
WBC # BLD AUTO: 1.7 10^3/UL (ref 3.5–10.8)

## 2019-02-12 RX ADMIN — LEVOTHYROXINE SODIUM SCH MCG: 137 TABLET ORAL at 05:53

## 2019-02-12 RX ADMIN — PIPERACILLIN AND TAZOBACTAM SCH MLS/HR: 3; .375 INJECTION, POWDER, LYOPHILIZED, FOR SOLUTION INTRAVENOUS; PARENTERAL at 22:19

## 2019-02-12 RX ADMIN — METOLAZONE SCH MG: 5 TABLET ORAL at 10:07

## 2019-02-12 RX ADMIN — GUAIFENESIN SCH ML: 100 SOLUTION ORAL at 14:00

## 2019-02-12 RX ADMIN — PANTOPRAZOLE SODIUM SCH MG: 40 TABLET, DELAYED RELEASE ORAL at 10:07

## 2019-02-12 RX ADMIN — GUAIFENESIN SCH ML: 100 SOLUTION ORAL at 21:14

## 2019-02-12 RX ADMIN — PREDNISONE SCH MG: 50 TABLET ORAL at 10:07

## 2019-02-12 RX ADMIN — GUAIFENESIN SCH ML: 100 SOLUTION ORAL at 18:23

## 2019-02-12 RX ADMIN — PIPERACILLIN AND TAZOBACTAM SCH MLS/HR: 3; .375 INJECTION, POWDER, LYOPHILIZED, FOR SOLUTION INTRAVENOUS; PARENTERAL at 05:53

## 2019-02-12 RX ADMIN — GUAIFENESIN SCH ML: 100 SOLUTION ORAL at 10:07

## 2019-02-12 RX ADMIN — IPRATROPIUM BROMIDE AND ALBUTEROL SULFATE PRN NEB: .5; 3 SOLUTION RESPIRATORY (INHALATION) at 00:58

## 2019-02-12 RX ADMIN — PIPERACILLIN AND TAZOBACTAM SCH MLS/HR: 3; .375 INJECTION, POWDER, LYOPHILIZED, FOR SOLUTION INTRAVENOUS; PARENTERAL at 15:02

## 2019-02-12 RX ADMIN — FUROSEMIDE SCH MG: 10 INJECTION, SOLUTION INTRAMUSCULAR; INTRAVENOUS at 11:11

## 2019-02-12 RX ADMIN — DOXYCYCLINE SCH MLS/HR: 100 INJECTION, POWDER, LYOPHILIZED, FOR SOLUTION INTRAVENOUS at 02:40

## 2019-02-12 RX ADMIN — DOXYCYCLINE SCH MLS/HR: 100 INJECTION, POWDER, LYOPHILIZED, FOR SOLUTION INTRAVENOUS at 14:00

## 2019-02-12 RX ADMIN — POTASSIUM CHLORIDE SCH MEQ: 1500 TABLET, EXTENDED RELEASE ORAL at 21:14

## 2019-02-12 RX ADMIN — Medication SCH DROP: at 21:14

## 2019-02-12 RX ADMIN — CALCITRIOL SCH UNITS: 1 SOLUTION ORAL at 10:07

## 2019-02-12 RX ADMIN — POTASSIUM CHLORIDE SCH MEQ: 1500 TABLET, EXTENDED RELEASE ORAL at 14:00

## 2019-02-12 RX ADMIN — Medication SCH DROP: at 10:08

## 2019-02-12 NOTE — PN
Subjective


Date of Service: 02/12/19


Interval History: 





Some cough with no/scant sputum.  No pain.  Appetite fair.  





Objective


Active Medications: 








Acetaminophen (Tylenol Tab*)  650 mg PO Q4H PRN


   PRN Reason: FEVER/PAIN


Al Hydrox/Mg Hydrox/Simethicone (Maalox Plus*)  30 ml PO Q6H PRN


   PRN Reason: INDIGESTION


Albuterol (Ventolin 2.5 Mg/3 Ml Neb.Sol*)  2.5 mg INH Q2H PRN


   PRN Reason: SOB/WHEEZING


Albuterol/Ipratropium (Duoneb (Albuterol 2.5 Mg/Ipratropium 0.5 Mg))  1 neb INH 

[id number] AWAKE PRN


   PRN Reason: SOB/WHEEZING


   Last Admin: 02/12/19 00:58 Dose:  1 neb


Calcitonin [place] (Fortical(Nr))  200 units ALT NARE DAILY [facility]; Protocol


   Last Admin: 02/12/19 10:07 Dose:  200 units


Carbamide Peroxide (Debrox 6.5% Otic*)  10 drop BOTH EARS BID [facility]


   Stop: 02/15/19 11:59


   Last Admin: 02/12/19 10:08 Dose:  10 drop


Guaifenesin (Robitussin*)  10 ml PO QID [facility]


   Last Admin: 02/12/19 10:07 Dose:  10 ml


Piperacillin Sod/Tazobactam (Sod 3.375 gm/ Sodium Chloride)  100 mls @ 25 mls/

hr IVPB Q8H [facility]


   Last Admin: 02/12/19 05:53 Dose:  25 mls/hr


Doxycycline Hyclate 100 mg/ (Sodium Chloride)  250 mls @ 250 mls/hr IVPB Q12H 

[facility]


   Stop: 02/15/19 14:29


   Last Admin: 02/12/19 02:40 Dose:  250 mls/hr


Levothyroxine Sodium (Synthroid Tab*)  137 mcg PO DAILY@0600 [facility]


   Last Admin: 02/12/19 05:53 Dose:  137 mcg


Metolazone (Zaroxolyn Tab*)  5 mg PO DAILY@0830 [facility]


   Last Admin: 02/12/19 10:07 Dose:  5 mg


Ondansetron HCl (Zofran Inj*)  4 mg IV Q4H PRN


   PRN Reason: NAUSEA/VOMITING


Pantoprazole Sodium (Protonix Tab*)  40 mg PO DAILY [facility]


   Last Admin: 02/12/19 10:07 Dose:  40 mg


Potassium Chloride (Klor Con Er Tab*)  20 meq PO TID [facility]


Prednisone (Deltasone Tab*)  50 mg PO DAILY [facility]


   Last Admin: 02/12/19 10:07 Dose:  50 mg


Torsemide (Demadex*)  20 mg PO DAILY [facility]








Oxygen Devices in Use Now: High Flow Nasal Cannula


Appearance: Alert, in a chair.  Occ harsh cough, otherwis looks comfortable.  

In good spirits.


Eyes: No Scleral Icterus


Neck: NL Appearance and Movements; NL JVP, No Thyroid Enlargement, Masses


Respiratory: Symmetrical Chest Expansion and Respiratory Effort, Clear to 

Percussion, - - diminished BS BL.


Cardiovascular: NL Sounds; No Murmurs; No JVD, RRR, No Edema, -


Extremities: No Edema, No Clubbing, Cyanosis, -


Skin: No Rash or Ulcers, No Nodules or Sclerosis, -


Neurological: Alert and Oriented x 3, NL Sensation


Result Diagrams: 


 02/12/19 06:09





 02/12/19 06:09


Microbiology and Other Data: 


 Microbiology











 02/04/19 22:39 Aerobic Blood Culture - Preliminary





 Blood Venous    No Growth Day 3





 Anaerobic Blood Culture - Preliminary





    No Growth Day 3


 


 02/04/19 22:31 Aerobic Blood Culture - Preliminary





 Blood Venous    No Growth Day 3





 Anaerobic Blood Culture - Preliminary





    No Growth Day 3


 


 02/06/19 20:10 Gram Stain - Final





 Sputum Expectorated 


 


 02/05/19 06:24 Legionella Urinary Antigen - Final





 Urine    Negative Legionella Antigen





 Streptococcus pneumoniae Ag Screen - Final





    Negative S. pneumo Antigen


 


 02/05/19 01:58 Nasal Screen MRSA (PCR) - Final





 Nasal    Mrsa Not Detected


 


 02/05/19 01:58 Influenza Types A,B Antigen - Final





 Nasal    Specimen received for Influenza A/B Molecular testing














Assess/Plan/Problems-Billing


Assessment: 











- Patient Problems


(1) Hypoxia


Current Visit: Yes   Status: Acute   Code(s): R09.02 - HYPOXEMIA   SNOMED Code(s

): 331350982


   Comment: ? due to both fluid overload and PNA


Has finished 7 days pip/hilario, will discuss with Dr. Smith.


Taper prednisone, change to oral diuretic, replete K+.


-Continue nebs   





(2) Hx of chronic lymphocytic leukemia


Current Visit: No   Status: Acute   Code(s): Z85.6 - PERSONAL HISTORY OF 

LEUKEMIA   SNOMED Code(s): 97726940262519


   Comment: Stable pancytopenia.  Dr. Rios has decided to treat her with 

RBC transfusions as needed.  Repeat CBC about 2/14.   





(3) Hyponatremia


Current Visit: No   Status: Acute   Code(s): E87.1 - HYPO-OSMOLALITY AND 

HYPONATREMIA   SNOMED Code(s): 98951093


   Comment: 


#Hypervolemic hyponatremia


-Continues to improve w/diuresis   





   





(4) History of poliomyelitis


Current Visit: No   Status: Acute   Code(s): Z86.12 - PERSONAL HISTORY OF 

POLIOMYELITIS   SNOMED Code(s): 388258215


   Comment: 


-R. leg weakness   





(5) Hypothyroidism


Current Visit: No   Status: Acute   Code(s): E03.9 - HYPOTHYROIDISM, 

UNSPECIFIED   SNOMED Code(s): 32736146


   Comment: 


-Continue Levothyroxine


-TSH WNL


   


Status and Disposition: 


-Appreciate PT input


-Pt would likely need ABBIE placement

## 2019-02-13 LAB
ANION GAP SERPL CALC-SCNC: 7 MMOL/L (ref 2–11)
BASOPHILS # BLD AUTO: 0 10^3/UL (ref 0–0.2)
BUN SERPL-MCNC: 21 MG/DL (ref 6–24)
BUN/CREAT SERPL: 42 (ref 8–20)
CALCIUM SERPL-MCNC: 8.6 MG/DL (ref 8.6–10.3)
CHLORIDE SERPL-SCNC: 91 MMOL/L (ref 101–111)
EOSINOPHIL # BLD AUTO: 0 10^3/UL (ref 0–0.6)
GLUCOSE SERPL-MCNC: 101 MG/DL (ref 70–100)
HCO3 SERPL-SCNC: 36 MMOL/L (ref 22–32)
HCT VFR BLD AUTO: 24 % (ref 35–47)
HGB BLD-MCNC: 8.2 G/DL (ref 12–16)
LYMPHOCYTES # BLD AUTO: 0.1 10^3/UL (ref 1–4.8)
MCH RBC QN AUTO: 33 PG (ref 27–31)
MCHC RBC AUTO-ENTMCNC: 34 G/DL (ref 31–36)
MCV RBC AUTO: 96 FL (ref 80–97)
MONOCYTES # BLD AUTO: 0 10^3/UL (ref 0–0.8)
NEUTROPHILS # BLD AUTO: 1.4 10^3/UL (ref 1.5–7.7)
NRBC # BLD AUTO: 0 10^3/UL
NRBC BLD QL AUTO: 0.2
PLATELET # BLD AUTO: 15 10^3/UL (ref 150–450)
POTASSIUM SERPL-SCNC: 3.3 MMOL/L (ref 3.5–5)
RBC # BLD AUTO: 2.49 10^6/UL (ref 4–5.4)
SODIUM SERPL-SCNC: 134 MMOL/L (ref 135–145)
WBC # BLD AUTO: 1.6 10^3/UL (ref 3.5–10.8)

## 2019-02-13 RX ADMIN — CALCITRIOL SCH UNITS: 1 SOLUTION ORAL at 07:30

## 2019-02-13 RX ADMIN — TORSEMIDE SCH MG: 20 TABLET ORAL at 07:30

## 2019-02-13 RX ADMIN — Medication SCH DROP: at 07:30

## 2019-02-13 RX ADMIN — POTASSIUM CHLORIDE SCH MEQ: 1500 TABLET, EXTENDED RELEASE ORAL at 13:28

## 2019-02-13 RX ADMIN — DOXYCYCLINE SCH MLS/HR: 100 INJECTION, POWDER, LYOPHILIZED, FOR SOLUTION INTRAVENOUS at 02:34

## 2019-02-13 RX ADMIN — GUAIFENESIN SCH ML: 100 SOLUTION ORAL at 13:28

## 2019-02-13 RX ADMIN — POTASSIUM CHLORIDE SCH MEQ: 1500 TABLET, EXTENDED RELEASE ORAL at 07:30

## 2019-02-13 RX ADMIN — METOLAZONE SCH MG: 5 TABLET ORAL at 07:31

## 2019-02-13 RX ADMIN — POTASSIUM CHLORIDE SCH MEQ: 1500 TABLET, EXTENDED RELEASE ORAL at 19:43

## 2019-02-13 RX ADMIN — GUAIFENESIN SCH ML: 100 SOLUTION ORAL at 07:29

## 2019-02-13 RX ADMIN — AMOXICILLIN AND CLAVULANATE POTASSIUM SCH MG: 500; 125 TABLET, FILM COATED ORAL at 19:43

## 2019-02-13 RX ADMIN — LEVOTHYROXINE SODIUM SCH MCG: 137 TABLET ORAL at 05:42

## 2019-02-13 RX ADMIN — PIPERACILLIN AND TAZOBACTAM SCH MLS/HR: 3; .375 INJECTION, POWDER, LYOPHILIZED, FOR SOLUTION INTRAVENOUS; PARENTERAL at 05:42

## 2019-02-13 RX ADMIN — IPRATROPIUM BROMIDE AND ALBUTEROL SULFATE PRN NEB: .5; 3 SOLUTION RESPIRATORY (INHALATION) at 04:56

## 2019-02-13 RX ADMIN — GUAIFENESIN SCH ML: 100 SOLUTION ORAL at 19:43

## 2019-02-13 RX ADMIN — GUAIFENESIN SCH ML: 100 SOLUTION ORAL at 17:23

## 2019-02-13 RX ADMIN — DOXYCYCLINE HYCLATE SCH MG: 100 CAPSULE ORAL at 19:43

## 2019-02-13 RX ADMIN — Medication SCH DROP: at 19:47

## 2019-02-13 RX ADMIN — PANTOPRAZOLE SODIUM SCH MG: 40 TABLET, DELAYED RELEASE ORAL at 07:31

## 2019-02-13 NOTE — CONSULT
Palliative / Hospice Consult


Ordering Provider: Frank Neumann





- Subjective


Code Status: DNR


Advance Directives Location: In Chart


MOLST Part A Completed: Yes - completed on chart


MOLST Part E Completed:: Yes - completed on chart





- History or Present Illness


History or Present Illness: 





84 yo female resident of [facility] since 11/18 with CLL diagnosed in 2003 

treated with chemo and radiation, last treated in 2018 when it had to be 

stopped due to pancytopenia. She is followed by Dr. Neumann. Pt was brought to ER 

with SOB and cough. She was found to be in acute hypoxic respiratory failure 

secondary to pnuemonia and CHF. Her other medical problems include R lower 

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 

the help of a walker but is only able to pivot now, hyperlipidemia, GERD, 

hypothyroid and osteoporosis. She has had frequent hospital visits 9/18 ER visit

, hip pain 10/27-28, 11/1-6/18 for anemia and 1/22-23/18 for pancytopenia. She 

was independently living until her 11/18 hospitalization. Her ECHO is 50-55%, 

EKG nl, CXR progressive consolidation of RML and RLL and LLL with atelectasis. 

BUN/Cr 21/.5 egfr 117, Calcium 8.6, tprot 5.1 and alb 3.2. She is a  of 40 

yrs and has 2 stepchildren son is in his 70's and not involved and daughter 

lives out of state. She has a friend Kemi who has been helping her when she 

lived independently.  She is a non smoker, drug user and no etoh.    


Lab Values: 


 Abnormal Lab Results











  02/12/19 02/13/19 02/13/19





  06:09 07:10 07:10


 


WBC   1.6 L 


 


RBC   2.49 L 


 


Hgb   8.2 L 


 


Hct   24 L 


 


MCV   96 


 


MCH   33 H 


 


MCHC   34 


 


RDW   23 H 


 


Plt Count   15 L* 


 


MPV   9.2 


 


Neut % (Auto)   88.9 


 


Lymph % (Auto)   8.6 


 


Mono % (Auto)   1.2 


 


Eos % (Auto)   0.9 


 


Baso % (Auto)   0.4 


 


Absolute Neuts (auto)   1.4 L 


 


Absolute Lymphs (auto)   0.1 L 


 


Absolute Monos (auto)   0 


 


Absolute Eos (auto)   0 


 


Absolute Basos (auto)   0 


 


Absolute Nucleated RBC   0 


 


Nucleated RBC %   0.2 


 


Sodium  134 L   134 L


 


Potassium  2.8 L   3.3 L


 


Chloride  90 L   91 L


 


Carbon Dioxide  36 H   36 H


 


Anion Gap  8   7


 


BUN  20   21


 


Creatinine  0.56   0.50 L


 


Est GFR ( Amer)  124.5   141.9


 


Est GFR (Non-Af Amer)  102.9   117.3


 


BUN/Creatinine Ratio  35.7 H   42.0 H


 


Glucose  106 H   101 H


 


Calcium  8.6   8.6


 


Magnesium  2.0  


 


C-Reactive Protein  72.07 H  








 Laboratory Last Values











WBC  1.6 10^3/ul (3.5-10.8)  L  02/13/19  07:10    


 


RBC  2.49 10^6/ul (4.00-5.40)  L  02/13/19  07:10    


 


Hgb  8.2 g/dl (12.0-16.0)  L  02/13/19  07:10    


 


Hct  24 % (35-47)  L  02/13/19  07:10    


 


MCV  96 fL (80-97)   02/13/19  07:10    


 


MCH  33 pg (27-31)  H  02/13/19  07:10    


 


MCHC  34 g/dl (31-36)   02/13/19  07:10    


 


RDW  23 % (10.5-15)  H  02/13/19  07:10    


 


Plt Count  15 10^3/ul (150-450)  L*  02/13/19  07:10    


 


MPV  9.2 fL (7.4-10.4)   02/13/19  07:10    


 


Neut % (Auto)  88.9 %  02/13/19  07:10    


 


Lymph % (Auto)  8.6 %  02/13/19  07:10    


 


Mono % (Auto)  1.2 %  02/13/19  07:10    


 


Eos % (Auto)  0.9 %  02/13/19  07:10    


 


Baso % (Auto)  0.4 %  02/13/19  07:10    


 


Absolute Neuts (auto)  1.4 10^3/ul (1.5-7.7)  L  02/13/19  07:10    


 


Absolute Lymphs (auto)  0.1 10^3/ul (1.0-4.8)  L  02/13/19  07:10    


 


Absolute Monos (auto)  0 10^3/ul (0-0.8)   02/13/19  07:10    


 


Absolute Eos (auto)  0 10^3/ul (0-0.6)   02/13/19  07:10    


 


Absolute Basos (auto)  0 10^3/ul (0-0.2)   02/13/19  07:10    


 


Absolute Nucleated RBC  0 10^3/ul  02/13/19  07:10    


 


Nucleated RBC %  0.2   02/13/19  07:10    


 


Polychromasia  1+   02/04/19  22:39    


 


Anisocytosis  2+   02/04/19  22:39    


 


Susie Cells  1+   02/04/19  22:39    


 


INR (Anticoag Therapy)  1.12  (0.77-1.02)  H  02/04/19  22:31    


 


APTT  27.8 seconds (26.0-36.3)   02/04/19  22:31    


 


ABG pH  7.45  (7.35-7.45)   02/04/19  22:39    


 


ABG pCO2  24 mmHg (35-45)  L  02/04/19  22:39    


 


ABG pO2  115 mmHg ()  H  02/04/19  22:39    


 


ABG HCO3  20.6 mmol/L (19-31)   02/04/19  22:39    


 


ABG O2 Saturation  99.4 % (94.0-98.0)  H  02/04/19  22:39    


 


ABG Base Excess  -5.5 mmol/L (-2.0-2.0)  L  02/04/19  22:39    


 


Sodium  134 mmol/L (135-145)  L  02/13/19  07:10    


 


Potassium  3.3 mmol/L (3.5-5.0)  L  02/13/19  07:10    


 


Chloride  91 mmol/L (101-111)  L  02/13/19  07:10    


 


Carbon Dioxide  36 mmol/L (22-32)  H  02/13/19  07:10    


 


Anion Gap  7 mmol/L (2-11)   02/13/19  07:10    


 


BUN  21 mg/dL (6-24)   02/13/19  07:10    


 


Creatinine  0.50 mg/dL (0.51-0.95)  L  02/13/19  07:10    


 


Est GFR ( Amer)  141.9  (>60)   02/13/19  07:10    


 


Est GFR (Non-Af Amer)  117.3  (>60)   02/13/19  07:10    


 


BUN/Creatinine Ratio  42.0  (8-20)  H  02/13/19  07:10    


 


Glucose  101 mg/dL ()  H  02/13/19  07:10    


 


Serum Osmolality  242 mOsm/kg (275-295)  L  02/04/19  22:30    


 


Lactic Acid  1.3 mmol/L (0.5-2.0)   02/04/19  22:39    


 


Calcium  8.6 mg/dL (8.6-10.3)   02/13/19  07:10    


 


Phosphorus  3.7 mg/dL (2.5-5.0)   02/11/19  06:37    


 


Magnesium  2.0 mg/dL (1.9-2.7)   02/12/19  06:09    


 


Total Bilirubin  1.10 mg/dL (0.2-1.0)  H  02/11/19  06:37    


 


AST  28 U/L (13-39)   02/11/19  06:37    


 


ALT  30 U/L (7-52)   02/11/19  06:37    


 


Alkaline Phosphatase  24 U/L ()  L  02/11/19  06:37    


 


Troponin I  0.23 ng/mL (<0.04)  H*  02/05/19  02:02    


 


C-Reactive Protein  72.07 mg/L (<8.01)  H  02/12/19  06:09    


 


B-Natriuretic Peptide  333 pg/mL (<=100)  H  02/10/19  05:55    


 


Total Protein  5.1 g/dL (6.4-8.9)  L  02/11/19  06:37    


 


Albumin  3.1 g/dL (3.2-5.2)  L  02/11/19  06:37    


 


Globulin  2.0 g/dL (2-4)   02/11/19  06:37    


 


Albumin/Globulin Ratio  1.6  (1-3)   02/11/19  06:37    


 


Prealbumin  7 mg/dL (18-38)  L  02/04/19  22:31    


 


TSH  2.99 mcIU/mL (0.34-5.60)   02/04/19  22:31    


 


Vancomycin Trough  16.0 mcg/mL  02/07/19  04:40    


 


Influenza A (Rapid)  Negative  (Negative)   02/05/19  02:39    


 


Influenza B (Rapid)  Negative  (Negative)   02/05/19  02:39    


 


Blood Type  B Positive   02/07/19  04:40    


 


Antibody Screen  Negative   02/07/19  04:40    


 


Crossmatch  See Detail   02/07/19  04:40    














- Objective


Active Medications: 








Acetaminophen (Tylenol Tab*)  650 mg PO Q4H PRN


   PRN Reason: FEVER/PAIN


Al Hydrox/Mg Hydrox/Simethicone (Maalox Plus*)  30 ml PO Q6H PRN


   PRN Reason: INDIGESTION


Albuterol (Ventolin 2.5 Mg/3 Ml Neb.Sol*)  2.5 mg INH Q2H PRN


   PRN Reason: SOB/WHEEZING


Albuterol/Ipratropium (Duoneb (Albuterol 2.5 Mg/Ipratropium 0.5 Mg))  1 neb INH 

[id number] AWAKE PRN


   PRN Reason: SOB/WHEEZING


   Last Admin: 02/13/19 04:56 Dose:  1 neb


Amoxicillin/Clavulanate Potassium (Augmentin Tab*)  500 mg PO BID [facility]


Atropine Sulfate (Atropine 1% (Oral/Sl)*)  2 drop SL Q2H PRN


   PRN Reason: DISCOMFORT


Calcitonin [place] (Fortical(Nr))  200 units ALT NARE DAILY [facility]; Protocol


   Last Admin: 02/13/19 07:30 Dose:  200 units


Carbamide Peroxide (Debrox 6.5% Otic*)  10 drop BOTH EARS BID [facility]


   Stop: 02/15/19 11:59


   Last Admin: 02/13/19 07:30 Dose:  10 drop


Doxycycline Hyclate (Vibramycin Cap(*))  100 mg PO BID [facility]


Guaifenesin (Robitussin*)  10 ml PO QID [facility]


   Last Admin: 02/13/19 07:29 Dose:  10 ml


Levothyroxine Sodium (Synthroid Tab*)  137 mcg PO DAILY@0600 [facility]


   Last Admin: 02/13/19 05:42 Dose:  137 mcg


Lorazepam (Ativan Tab(*))  0.5 mg SL Q3H PRN


   PRN Reason: ANXIETY


Metolazone (Zaroxolyn Tab*)  5 mg PO DAILY@0830 [facility]


   Last Admin: 02/13/19 07:31 Dose:  5 mg


Morphine Sulfate (Morphine Oral Concentrate*)  2.5 mg SL Q30M PRN


   PRN Reason: PAIN


Ondansetron HCl (Zofran Inj*)  4 mg IV Q4H PRN


   PRN Reason: NAUSEA/VOMITING


Pantoprazole Sodium (Protonix Tab*)  40 mg PO DAILY [facility]


   Last Admin: 02/13/19 07:31 Dose:  40 mg


Potassium Chloride (Klor Con Er Tab*)  20 meq PO TID [facility]


   Last Admin: 02/13/19 07:30 Dose:  20 meq


Prednisone (Deltasone Tab*)  30 mg PO DAILY [facility]


Torsemide (Demadex*)  20 mg PO DAILY [facility]


   Last Admin: 02/13/19 07:30 Dose:  20 mg








Vital Signs: 


Vital Signs:











Temp Pulse Resp BP Pulse Ox


 


 98.7 F   70   18   109/52   98 


 


 02/13/19 11:29  02/13/19 11:29  02/13/19 11:29  02/13/19 11:29  02/13/19 11:29











Patient Weight: 


 





Weight                           53.524 kg








Intake and Output: 


 Intake & Output











 02/11/19 02/12/19 02/13/19 02/14/19





 06:59 06:59 06:59 06:59


 


Intake Total  


 


Output Total 2325 400 700 


 


Balance -1975 430 783 


 


Weight 59.239 kg 56.427 kg 53.524 kg 


 


Intake:    


 


  IV Fluids   543 


 


    all fluids   543 


 


  IVPB   350 


 


    ABX - ZOSYN   100 


 


    doxy   250 


 


  Oral 350 830 590 


 


Output:    


 


  Urine 2325 400 700 


 


Other:    


 


  Estimated Void Medium Medium Medium 


 


  # Bowel Movements 1 1 0 


 


  Estimated Stool Amount  Medium Medium 


 


  # Voids 4 1 1 





 





ADLs: Meal  Record                                         Start:  02/05/19 01:

36


Freq:   09,13,18                                           Status: Inactive    

  


Protocol:                                                                      

  


 Created      02/05/19 01:36  System  (Rec: 02/05/19 01:36  System  ICU-C06)


 Document     02/05/19 09:00  [id number]  (Rec: 02/05/19 11:06  [id number]  ICU-C07)


 Document     02/05/19 13:00  [id number]  (Rec: 02/05/19 14:36  [id number]  ICU-C07)


 Document     02/05/19 18:00  [id number]  (Rec: 02/05/19 23:36  [id number]  ICU-C07)


 Document     02/06/19 09:00  [id number]  (Rec: 02/06/19 11:45  [id number]  ICU-C07)


 Document     02/06/19 13:00  [id number]  (Rec: 02/06/19 14:27  [id number]  ICU-C07)


 Document     02/07/19 09:00  [id number]  (Rec: 02/07/19 11:33  [id number]  ICU-C07)


ADLs: Meal  Record                                         Start:  02/08/19 09:

04


Freq:   0900,1400,1800                                     Status: Active      

  


Protocol:                                                                      

  


 Created      02/08/19 09:04  [id number]  (Rec: 02/08/19 09:04  [id number]  MED-C11)


 Document     02/08/19 09:39  [id number]  (Rec: 02/08/19 09:39  [id number]  MED-C11)


 Document     02/08/19 14:00  [id number]  (Rec: 02/08/19 15:37  [id number]  MED-C11)


 Document     02/08/19 18:22  [id number]  (Rec: 02/08/19 18:22  [id number]  MED-C11)


 Document     02/09/19 09:00  [id number]  (Rec: 02/09/19 10:07  [id number]  MED-C09)


 Document     02/09/19 13:57  [id number]  (Rec: 02/09/19 13:57  [id number]  MED-M01)


 Document     02/09/19 18:00  [id number]  (Rec: 02/09/19 18:46  [id number]  MED-C15)


 Document     02/10/19 09:00  [id number]  (Rec: 02/10/19 09:36  [id number]  MED-C11)


 Document     02/10/19 14:00  [id number]  (Rec: 02/10/19 15:12  [id number]  MED-C09)


 Document     02/10/19 18:00  [id number]  (Rec: 02/10/19 19:00  [id number]  MED-C09)


 Document     02/11/19 09:00  BMG1113  (Rec: 02/11/19 10:19  [id number]  MED-C11)


 Document     02/11/19 14:00  [id number]  (Rec: 02/11/19 14:44  [id number]  MED-C11)


 Document     02/11/19 18:00  [id number]  (Rec: 02/11/19 19:01  [id number]  MED-C04)


 Document     02/12/19 09:00  [id number]  (Rec: 02/12/19 17:42  [id number]  MED-C11)


 Document     02/12/19 14:00  OGM8582  (Rec: 02/12/19 17:42  [id number]  MED-C11)


 Document     02/12/19 18:00  [id number]  (Rec: 02/12/19 21:52  [id number]  MED-C14)


Intake and Output                                          Start:  02/04/19 22:

11


Freq:                                                      Status: Cancelled   

  


Protocol:                                                                      

  


 Created      02/04/19 22:11  System  (Rec: 02/04/19 22:11  System  EDRM-C14)


Intake and Output                                          Start:  02/05/19 01:

36


Freq:   Q1HR                                               Status: Cancelled   

  


Protocol:                                                                      

  


 Created      02/05/19 01:36  System  (Rec: 02/05/19 01:36  System  ICU-C06)


Intake and Output                                          Start:  02/05/19 05:

31


Freq:   06,14,2200                                         Status: Active      

  


Protocol:                                                                      

  


 Created      02/05/19 05:32  [id number]  (Rec: 02/05/19 05:32  BKG  CECILIO-BG12)


 Document     02/05/19 06:00  [id number]  (Rec: 02/05/19 08:09  [id number]  ICU-C03)


 Document     02/05/19 08:00  [id number]  (Rec: 02/05/19 10:33  [id number]  ICU-C07)


 Document     02/05/19 10:00  [id number]  (Rec: 02/05/19 10:33  [id number]  ICU-C07)


 Document     02/05/19 12:00  XMG1885  (Rec: 02/05/19 13:39  [id number]  ICU-C07)


 Document     02/05/19 14:00  [id number]  (Rec: 02/05/19 14:44  [id number]  ICU-C07)


 Document     02/05/19 16:00  WPU4560  (Rec: 02/05/19 16:05  [id number]  ICU-C07)


 Document     02/05/19 18:00  BOT1601  (Rec: 02/05/19 18:34  [id number]  ICU-C07)


 Document     02/05/19 21:00  [id number]  (Rec: 02/05/19 23:39  [id number]  ICU-C07)


 Document     02/06/19 02:00  [id number]  (Rec: 02/06/19 02:35  [id number]  ICU-C07)


 Document     02/06/19 08:00  GZW3529  (Rec: 02/06/19 09:45  [id number]  ICU-C07)


 Document     02/06/19 10:00  [id number]  (Rec: 02/06/19 12:32  [id number]  ICU-C07)


 Document     02/06/19 12:00  ISA2713  (Rec: 02/06/19 12:48  [id number]  ICU-C07)


 Document     02/06/19 14:00  CTQ6514  (Rec: 02/06/19 14:49  [id number]  ICU-M24)


 Document     02/06/19 16:00  [id number]  (Rec: 02/06/19 16:05  [id number]  ICU-M24)


 Document     02/06/19 20:00  QIU3616  (Rec: 02/06/19 20:28  [id number]  ICU-M24)


 Document     02/06/19 22:00  DGL0313  (Rec: 02/06/19 22:26  [id number]  ICU-M29)


 Document     02/07/19 00:00  [id number]  (Rec: 02/07/19 01:19  [id number]  ICU-C07)


 Document     02/07/19 02:00  MYB7521  (Rec: 02/07/19 02:52  [id number]  ICU-C07)


 Document     02/07/19 03:06  HPM6880  (Rec: 02/07/19 03:06  [id number]  ICU-C07)


 Document     02/07/19 04:00  IHY5459  (Rec: 02/07/19 04:52  [id number]  ICU-C07)


 Document     02/07/19 06:00  DZU2079  (Rec: 02/07/19 06:07  [id number]  ICU-C07)


 Document     02/07/19 22:00  [id number]  (Rec: 02/07/19 23:44  [id number]  MED-C14)


 Document     02/08/19 05:32  ZHL7636  (Rec: 02/08/19 05:32  [id number]  MED-C09)


 Document     02/08/19 06:16  WOF9001  (Rec: 02/08/19 06:17  [id number]  MED-M09)


 Document     02/08/19 14:00  WYW8978  (Rec: 02/08/19 15:37  [id number]  MED-C11)


 Document     02/08/19 22:00  [id number]  (Rec: 02/08/19 23:49  [id number]  MED-C04)


 Document     02/09/19 06:00  [id number]  (Rec: 02/09/19 07:04  [id number]  MED-C04)


 Document     02/09/19 14:00  [id number]  (Rec: 02/09/19 14:40  [id number]  MED-C09)


 Document     02/09/19 20:57  [id number]  (Rec: 02/09/19 20:58  [id number]  MED-C42)


 Document     02/10/19 06:00  [id number]  (Rec: 02/10/19 06:34  [id number]  MED-C11)


 Document     02/10/19 14:00  [id number]  (Rec: 02/10/19 15:11  [id number]  MED-C09)


 Document     02/10/19 22:00  [id number]  (Rec: 02/10/19 22:12  [id number]  MED-C02)


 Document     02/11/19 00:24  [id number]  (Rec: 02/11/19 00:24  [id number]  MED-C12)


 Document     02/11/19 06:00  [id number]  (Rec: 02/11/19 06:19  [id number]  MED-C11)


 Document     02/11/19 22:00  [id number]  (Rec: 02/11/19 22:06  [id number]  MED-C04)


 Document     02/12/19 06:00  [id number]  (Rec: 02/12/19 06:17  [id number]  MED-C07)


 Document     02/12/19 21:55  [id number]  (Rec: 02/12/19 21:57  [id number]  MED-C14)


 Document     02/13/19 05:33  [id number]  (Rec: 02/13/19 05:34  [id number]  MED-C09)








Eyes: No Scleral Icterus


Neck: NL Appearance and Movements; NL JVP, No Thyroid Enlargement, Masses


Cardiovascular: NL Sounds; No Murmurs; No JVD, RRR, No Edema, -


Extremities: No Edema, No Clubbing, Cyanosis, -


Neurological: Alert and Oriented x 3, NL Sensation, NL Gait, NL Muscle Strength 

and Tone, -





- Assessment


Assessment: 





84 yo female with CLL, pancytopenia and acute hypoxia respiratory failure





- Plan


Consult Plan (MU): Hospice


Plan: 





Spoke with pt she is eager to get back to [facility] she is interested to 

have a hospice evaluation because she realizes her CLL is getting worse and no 

treatment options available also notes her breathing is getting worse and she 

is declining. She doesn't want to keep coming back to the hospital and she is 

aware that she is at the end of her life. She should qualify with CLL with 

pancytopenia and no further treatment options, acute hypoxic respiratory 

failure O2 dependent due to pneumonia and CHF. KPS 20-30%, PPS 30-40%. Case 

manager is submitting referral to hospice. Pt follows with Dr. Neumann.





- Time On Unit


Date of Evaluation: 02/13/19


Hospice Consult Time in: 01:00


Hospice Consult Time Out: 02:00


Hospice Consult Time Total: 60


> 50% of Time Spend In Counseling or Coordinating Care: Yes

## 2019-02-13 NOTE — PN
Subjective


Date of Service: 02/13/19


Interval History: 





No subj change.





Objective


Active Medications: 








Acetaminophen (Tylenol Tab*)  650 mg PO Q4H PRN


   PRN Reason: FEVER/PAIN


Al Hydrox/Mg Hydrox/Simethicone (Maalox Plus*)  30 ml PO Q6H PRN


   PRN Reason: INDIGESTION


Albuterol (Ventolin 2.5 Mg/3 Ml Neb.Sol*)  2.5 mg INH Q2H PRN


   PRN Reason: SOB/WHEEZING


Albuterol/Ipratropium (Duoneb (Albuterol 2.5 Mg/Ipratropium 0.5 Mg))  1 neb INH 

[id number] AWAKE PRN


   PRN Reason: SOB/WHEEZING


   Last Admin: 02/13/19 04:56 Dose:  1 neb


Amoxicillin/Clavulanate Potassium (Augmentin Tab*)  500 mg PO BID [facility]


Calcitonin [place] (Fortical(Nr))  200 units ALT NARE DAILY [facility]; Protocol


   Last Admin: 02/13/19 07:30 Dose:  200 units


Carbamide Peroxide (Debrox 6.5% Otic*)  10 drop BOTH EARS BID [facility]


   Stop: 02/15/19 11:59


   Last Admin: 02/13/19 07:30 Dose:  10 drop


Doxycycline Hyclate (Vibramycin Cap(*))  100 mg PO BID [facility]


Guaifenesin (Robitussin*)  10 ml PO QID [facility]


   Last Admin: 02/13/19 07:29 Dose:  10 ml


Levothyroxine Sodium (Synthroid Tab*)  137 mcg PO DAILY@0600 [facility]


   Last Admin: 02/13/19 05:42 Dose:  137 mcg


Metolazone (Zaroxolyn Tab*)  5 mg PO DAILY@0830 [facility]


   Last Admin: 02/13/19 07:31 Dose:  5 mg


Ondansetron HCl (Zofran Inj*)  4 mg IV Q4H PRN


   PRN Reason: NAUSEA/VOMITING


Pantoprazole Sodium (Protonix Tab*)  40 mg PO DAILY [facility]


   Last Admin: 02/13/19 07:31 Dose:  40 mg


Potassium Chloride (Klor Con Er Tab*)  20 meq PO TID [facility]


   Last Admin: 02/13/19 07:30 Dose:  20 meq


Prednisone (Deltasone Tab*)  40 mg PO DAILY [facility]


   Last Admin: 02/13/19 07:31 Dose:  40 mg


Torsemide (Demadex*)  20 mg PO DAILY [facility]


   Last Admin: 02/13/19 07:30 Dose:  20 mg








 Vital Signs - 8 hr











  02/13/19 02/13/19 02/13/19





  03:56 04:58 07:14


 


Temperature   97.8 F


 


Pulse Rate 72 75 81


 


Respiratory 16 14 22





Rate   


 


Blood Pressure   126/51





(mmHg)   


 


O2 Sat by Pulse 94 93 94





Oximetry   














  02/13/19 02/13/19





  07:44 08:00


 


Temperature  


 


Pulse Rate  70


 


Respiratory 22 18





Rate  


 


Blood Pressure  





(mmHg)  


 


O2 Sat by Pulse  96





Oximetry  











Oxygen Devices in Use Now: High Flow Nasal Cannula


Appearance: Alert, partly up in bed.  In good spirits.  Looks comfortable.


Neck: NL Appearance and Movements; NL JVP, No Thyroid Enlargement, Masses


Respiratory: Symmetrical Chest Expansion and Respiratory Effort, Clear to 

Auscultation, Clear to Percussion


Cardiovascular: NL Sounds; No Murmurs; No JVD, RRR, No Edema, -


Extremities: No Edema, No Clubbing, Cyanosis, -


Skin: No Rash or Ulcers, No Nodules or Sclerosis, -


Neurological: Alert and Oriented x 3, NL Sensation, NL Gait, NL Muscle Strength 

and Tone, -


Result Diagrams: 


 02/13/19 07:10





 02/13/19 07:10


Microbiology and Other Data: 


 Microbiology











 02/04/19 22:39 Aerobic Blood Culture - Preliminary





 Blood Venous    No Growth Day 3





 Anaerobic Blood Culture - Preliminary





    No Growth Day 3


 


 02/04/19 22:31 Aerobic Blood Culture - Preliminary





 Blood Venous    No Growth Day 3





 Anaerobic Blood Culture - Preliminary





    No Growth Day 3


 


 02/06/19 20:10 Gram Stain - Final





 Sputum Expectorated 


 


 02/05/19 06:24 Legionella Urinary Antigen - Final





 Urine    Negative Legionella Antigen





 Streptococcus pneumoniae Ag Screen - Final





    Negative S. pneumo Antigen


 


 02/05/19 01:58 Nasal Screen MRSA (PCR) - Final





 Nasal    Mrsa Not Detected


 


 02/05/19 01:58 Influenza Types A,B Antigen - Final





 Nasal    Specimen received for Influenza A/B Molecular testing














Assess/Plan/Problems-Billing


Assessment: 











- Patient Problems


(1) Hypoxia


Current Visit: Yes   Status: Acute   Code(s): R09.02 - HYPOXEMIA   SNOMED Code(s

): 909108513


   Comment: ? due to both fluid overload and PNA


Change to po antibiotics to finish 2/19.


Taper prednisone, continue oral diuretic,  K+.


-Continue nebs   





(2) Hx of chronic lymphocytic leukemia


Current Visit: No   Status: Acute   Code(s): Z85.6 - PERSONAL HISTORY OF 

LEUKEMIA   SNOMED Code(s): 72294845938358


   Comment: Stable pancytopenia.  Dr. Rios has decided to treat her with 

RBC transfusions as needed.  Repeat CBC about 2/14.   





(3) Hyponatremia


Current Visit: No   Status: Acute   Code(s): E87.1 - HYPO-OSMOLALITY AND 

HYPONATREMIA   SNOMED Code(s): 50922293


   Comment: 


#Hypervolemic hyponatremia


-Continues to improve w/diuresis   





   





(4) History of poliomyelitis


Current Visit: No   Status: Acute   Code(s): Z86.12 - PERSONAL HISTORY OF 

POLIOMYELITIS   SNOMED Code(s): 678198414


   Comment: 


-R. leg weakness   





(5) Hypothyroidism


Current Visit: No   Status: Acute   Code(s): E03.9 - HYPOTHYROIDISM, 

UNSPECIFIED   SNOMED Code(s): 66478604


   Comment: 


-Continue Levothyroxine


-TSH WNL


   





(6) End of life care


Current Visit: Yes   Status: Acute   Code(s): Z51.5 - ENCOUNTER FOR PALLIATIVE 

CARE   SNOMED Code(s): 792306500


   Comment: Patient is very limited by her advanced CLL, poorly responsive PNA 

and/or CHF.  She agrees to comfort measures only, no further hospitalizations. 

Hospice referral to be made by CM.   


Status and Disposition: 


-Appreciate PT input


-Pt would likely need ABBIE placement

## 2019-02-14 RX ADMIN — LEVOTHYROXINE SODIUM SCH MCG: 137 TABLET ORAL at 05:43

## 2019-02-14 RX ADMIN — AMOXICILLIN AND CLAVULANATE POTASSIUM SCH MG: 500; 125 TABLET, FILM COATED ORAL at 22:11

## 2019-02-14 RX ADMIN — GUAIFENESIN SCH ML: 100 SOLUTION ORAL at 22:11

## 2019-02-14 RX ADMIN — DOXYCYCLINE HYCLATE SCH MG: 100 CAPSULE ORAL at 22:11

## 2019-02-14 RX ADMIN — POTASSIUM CHLORIDE SCH MEQ: 1500 TABLET, EXTENDED RELEASE ORAL at 08:28

## 2019-02-14 RX ADMIN — GUAIFENESIN SCH ML: 100 SOLUTION ORAL at 13:37

## 2019-02-14 RX ADMIN — TORSEMIDE SCH MG: 20 TABLET ORAL at 08:28

## 2019-02-14 RX ADMIN — PANTOPRAZOLE SODIUM SCH MG: 40 TABLET, DELAYED RELEASE ORAL at 08:28

## 2019-02-14 RX ADMIN — POTASSIUM CHLORIDE SCH MEQ: 1500 TABLET, EXTENDED RELEASE ORAL at 13:37

## 2019-02-14 RX ADMIN — AMOXICILLIN AND CLAVULANATE POTASSIUM SCH MG: 500; 125 TABLET, FILM COATED ORAL at 08:28

## 2019-02-14 RX ADMIN — GUAIFENESIN SCH ML: 100 SOLUTION ORAL at 18:02

## 2019-02-14 RX ADMIN — Medication SCH DROP: at 22:11

## 2019-02-14 RX ADMIN — POTASSIUM CHLORIDE SCH MEQ: 1500 TABLET, EXTENDED RELEASE ORAL at 22:11

## 2019-02-14 RX ADMIN — CALCITRIOL SCH UNITS: 1 SOLUTION ORAL at 08:26

## 2019-02-14 RX ADMIN — Medication SCH DROP: at 08:28

## 2019-02-14 RX ADMIN — DOXYCYCLINE HYCLATE SCH MG: 100 CAPSULE ORAL at 08:28

## 2019-02-14 RX ADMIN — GUAIFENESIN SCH ML: 100 SOLUTION ORAL at 08:27

## 2019-02-14 NOTE — PN
Subjective


Date of Service: 02/14/19


Interval History: 





Pt is feeling ok. She states her breathing is at about baseline. She denies any 

pain. She is ready to either go back to [facility] or the hospice residence on d/c.





Objective


Active Medications: 








Acetaminophen (Tylenol Tab*)  650 mg PO Q4H PRN


   PRN Reason: FEVER/PAIN


Al Hydrox/Mg Hydrox/Simethicone (Maalox Plus*)  30 ml PO Q6H PRN


   PRN Reason: INDIGESTION


Albuterol (Ventolin 2.5 Mg/3 Ml Neb.Sol*)  2.5 mg INH Q2H PRN


   PRN Reason: SOB/WHEEZING


Albuterol/Ipratropium (Duoneb (Albuterol 2.5 Mg/Ipratropium 0.5 Mg))  1 neb INH 

[id number] AWAKE PRN


   PRN Reason: SOB/WHEEZING


   Last Admin: 02/13/19 04:56 Dose:  1 neb


Amoxicillin/Clavulanate Potassium (Augmentin Tab*)  500 mg PO BID [facility]


   Last Admin: 02/14/19 08:28 Dose:  500 mg


Atropine Sulfate (Atropine 1% (Oral/Sl)*)  2 drop SL Q2H PRN


   PRN Reason: DISCOMFORT


Calcitonin [place] (Fortical(Nr))  200 units ALT NARE DAILY [facility]; Protocol


   Last Admin: 02/14/19 08:26 Dose:  200 units


Carbamide Peroxide (Debrox 6.5% Otic*)  10 drop BOTH EARS BID [facility]


   Stop: 02/15/19 11:59


   Last Admin: 02/14/19 08:28 Dose:  10 drop


Doxycycline Hyclate (Vibramycin Cap(*))  100 mg PO BID [facility]


   Last Admin: 02/14/19 08:28 Dose:  100 mg


Guaifenesin (Robitussin*)  10 ml PO QID [facility]


   Last Admin: 02/14/19 13:37 Dose:  10 ml


Levothyroxine Sodium (Synthroid Tab*)  137 mcg PO DAILY@0600 [facility]


   Last Admin: 02/14/19 05:43 Dose:  137 mcg


Lorazepam (Ativan Tab(*))  0.5 mg SL Q3H PRN


   PRN Reason: ANXIETY


Morphine Sulfate (Morphine Oral Concentrate*)  2.5 mg SL Q30M PRN


   PRN Reason: PAIN


Ondansetron HCl (Zofran Inj*)  4 mg IV Q4H PRN


   PRN Reason: NAUSEA/VOMITING


Pantoprazole Sodium (Protonix Tab*)  40 mg PO DAILY [facility]


   Last Admin: 02/14/19 08:28 Dose:  40 mg


Potassium Chloride (Klor Con Er Tab*)  20 meq PO TID [facility]


   Last Admin: 02/14/19 13:37 Dose:  20 meq


Potassium Chloride (Klor-Con Liquid*)  20 meq PO ONCE ONE


   Stop: 02/14/19 17:19


Prednisone (Deltasone Tab*)  30 mg PO DAILY [facility]


   Last Admin: 02/14/19 08:28 Dose:  30 mg


Torsemide (Demadex*)  20 mg PO DAILY [facility]


   Last Admin: 02/14/19 08:28 Dose:  20 mg








 Vital Signs - 8 hr











  02/14/19





  12:18


 


Temperature 97.7 F


 


Pulse Rate 81


 


Respiratory 16





Rate 


 


Blood Pressure 96/43





(mmHg) 


 


O2 Sat by Pulse 97





Oximetry 











Oxygen Devices in Use Now: Nasal Cannula


Appearance: Elderly female lying in bed, NAD


Eyes: No Scleral Icterus


Ears/Nose/Mouth/Throat: Mucous Membranes Moist


Respiratory: Symmetrical Chest Expansion and Respiratory Effort, Clear to 

Auscultation - decreased breath sounds throughout with fine crackles at the L 

base


Cardiovascular: NL Sounds; No Murmurs; No JVD, RRR, No Edema


Abdominal: NL Sounds; No Tenderness; No Distention


Extremities: No Clubbing, Cyanosis


Skin: No Nodules or Sclerosis


Neurological: Alert and Oriented x 3


Result Diagrams: 


 02/13/19 07:10





 02/13/19 07:10


Microbiology and Other Data: 


 Microbiology











 02/04/19 22:39 Aerobic Blood Culture - Preliminary





 Blood Venous    No Growth Day 3





 Anaerobic Blood Culture - Preliminary





    No Growth Day 3


 


 02/04/19 22:31 Aerobic Blood Culture - Preliminary





 Blood Venous    No Growth Day 3





 Anaerobic Blood Culture - Preliminary





    No Growth Day 3


 


 02/06/19 20:10 Gram Stain - Final





 Sputum Expectorated 


 


 02/05/19 06:24 Legionella Urinary Antigen - Final





 Urine    Negative Legionella Antigen





 Streptococcus pneumoniae Ag Screen - Final





    Negative S. pneumo Antigen


 


 02/05/19 01:58 Nasal Screen MRSA (PCR) - Final





 Nasal    Mrsa Not Detected


 


 02/05/19 01:58 Influenza Types A,B Antigen - Final





 Nasal    Specimen received for Influenza A/B Molecular testing














Assess/Plan/Problems-Billing


Ms Saenz is an 84 yo F who has a h/o advanced CLL with no more treatment 

options, hypothyroidism and HLD who presented to the ER with c/o SOB and was 

admitted for acute respiratory distress secondary to HCAP and decompensated 

CHF. 





- Patient Problems


(1) HCAP (healthcare-associated pneumonia)


Current Visit: Yes   Status: Acute   Code(s): J18.9 - PNEUMONIA, UNSPECIFIED 

ORGANISM   SNOMED Code(s): 858889861


   Comment: The patient has been treated with several Abx-now on augmentin and 

doxycycline. Stop Abx therapy after tomorrow's dose.    





(2) Diastolic CHF


Current Visit: Yes   Status: Acute   Code(s): I50.30 - UNSPECIFIED DIASTOLIC (

CONGESTIVE) HEART FAILURE   SNOMED Code(s): 187251590


   Comment: Pt was felt to be in decompensated CHF on admission. She was 

diuresed with IV lasix and metolazone. She is now on torsemide and appears 

relatively euvolemic.    





(3) CLL (chronic lymphocytic leukemia)


Current Visit: Yes   Status: Acute   Code(s): C91.90 - LYMPHOID LEUKEMIA, 

UNSPECIFIED NOT HAVING ACHIEVED REMISSION   SNOMED Code(s): 75896195


   Comment: Pt with pancytopenia secondary to CLL- no further treatment options 

at this time. Pt is requesting hospice and will be signed on soon.    





(4) Hypothyroidism


Current Visit: Yes   Status: Acute   Code(s): E03.9 - HYPOTHYROIDISM, 

UNSPECIFIED   SNOMED Code(s): 02636974


   Comment: Continue current synthroid dose. 


   





(5) HLD (hyperlipidemia)


Current Visit: Yes   Status: Acute   Code(s): E78.5 - HYPERLIPIDEMIA, 

UNSPECIFIED   SNOMED Code(s): 17930698


   Comment: Statin is on hold.    





(6) DVT prophylaxis


Current Visit: Yes   Status: Acute   Code(s): CRG0740 -    SNOMED Code(s): 

718140995


   Comment: SCDs only secondary to severe thrombocytopenia   





(7) DNR (do not resuscitate)


Current Visit: Yes   Status: Acute   


Status and Disposition: 


.

## 2019-02-15 VITALS — DIASTOLIC BLOOD PRESSURE: 45 MMHG | SYSTOLIC BLOOD PRESSURE: 126 MMHG

## 2019-02-15 LAB
ANION GAP SERPL CALC-SCNC: 8 MMOL/L (ref 2–11)
BUN SERPL-MCNC: 32 MG/DL (ref 6–24)
BUN/CREAT SERPL: 48.5 (ref 8–20)
CALCIUM SERPL-MCNC: 9.1 MG/DL (ref 8.6–10.3)
CHLORIDE SERPL-SCNC: 92 MMOL/L (ref 101–111)
GLUCOSE SERPL-MCNC: 93 MG/DL (ref 70–100)
HCO3 SERPL-SCNC: 35 MMOL/L (ref 22–32)
POTASSIUM SERPL-SCNC: 4.5 MMOL/L (ref 3.5–5)
SODIUM SERPL-SCNC: 135 MMOL/L (ref 135–145)

## 2019-02-15 RX ADMIN — GUAIFENESIN SCH ML: 100 SOLUTION ORAL at 08:25

## 2019-02-15 RX ADMIN — Medication SCH DROP: at 08:26

## 2019-02-15 RX ADMIN — POTASSIUM CHLORIDE SCH MEQ: 1500 TABLET, EXTENDED RELEASE ORAL at 08:26

## 2019-02-15 RX ADMIN — DOXYCYCLINE HYCLATE SCH MG: 100 CAPSULE ORAL at 08:25

## 2019-02-15 RX ADMIN — AMOXICILLIN AND CLAVULANATE POTASSIUM SCH MG: 500; 125 TABLET, FILM COATED ORAL at 08:25

## 2019-02-15 RX ADMIN — POTASSIUM CHLORIDE SCH MEQ: 1500 TABLET, EXTENDED RELEASE ORAL at 14:01

## 2019-02-15 RX ADMIN — PANTOPRAZOLE SODIUM SCH MG: 40 TABLET, DELAYED RELEASE ORAL at 08:25

## 2019-02-15 RX ADMIN — TORSEMIDE SCH MG: 20 TABLET ORAL at 08:25

## 2019-02-15 RX ADMIN — CALCITRIOL SCH UNITS: 1 SOLUTION ORAL at 08:26

## 2019-02-15 RX ADMIN — GUAIFENESIN SCH ML: 100 SOLUTION ORAL at 14:01

## 2019-02-15 RX ADMIN — LEVOTHYROXINE SODIUM SCH MCG: 137 TABLET ORAL at 05:31

## 2019-02-15 NOTE — DS
CC:  Elaine Carmona; Dr. Neumann *

 

DATE OF ADMISSION:  02/05/2019.

 

DATE OF DISCHARGE:  02/15/2019.

 

PRIMARY CARE PHYSICIAN:  Provider at [facility].

 

PRINCIPAL DIAGNOSES:

1.  Healthcare associated pneumonia.

2.  Acute decompensated diastolic congestive heart failure.

3.  Advanced CLL with no further treatment options.

 

SECONDARY DIAGNOSES:

1.  Hyponatremia.

2.  Hypothyroidism.

3.  Hyperlipidemia.

4.  GERD.

 

DISCHARGE MEDICATIONS:

1.  Simvastatin 20 mg p.o. daily.

2.  Evista 60 mg p.o. daily.

3.  Omeprazole 20 mg p.o. daily.

4.  Levothyroxine 137 mcg p.o. daily.

5.  Calcitonin 1 mg alternate nares daily.

6.  Albuterol two puffs inhaled q.6 hours prn shortness of breath.

7.  Tylenol 650 mg p.o. q.6 hours prn pain.

8.  Prednisone 20 mg daily times 1 day, then 10 mg daily times 3 days.

9.  Robitussin 10 ml p.o. 4 times daily prn cough.

10. Torsemide 20 mg p.o. daily (new).

11. Potassium Chloride 20 mEq p.o. t.i.d. (new).

12. Morphine oral concentrate 2.5 mg SL q.30 minutes prn pain or shortness of 
breath.

13. Ativan 0.5 mg p.o./SL q.3 hours prn anxiety.

14. Albuterol one neb inhaled q.2 hours prn shortness of breath.

 

HOSPITAL COURSE:  Ms. Ramirez is an 85-year-old female with a history of 
advanced CLL who has not been offered any further treatment, who presented to 
the emergency room on 02/04/2019 with complaints of shortness of breath.  The 
patient was found to be in respiratory distress.  She was felt to have likely a 
healthcare associated pneumonia as well as probably CHF exacerbation.  The 
patient did require management in the ICU for acute hypoxic respiratory failure 
secondary to pneumonia and diastolic CHF, as well as severe hyponatremia.  The 
patient has been pancytopenic throughout her hospitalization.  Her counts had 
been fairly stable; however, on 02/07/2019 she was transfused one unit of 
packed red blood cells for a hemoglobin of 6.8.  Her hemoglobin since then has 
been fairly stable.  In terms of her sodium level, she presented at 112.  She 
was initially treated with hypertonic saline, though was then felt to have 
hypervolemic hyponatremia and was subsequently treated with IV Lasix.  With 
diuresis, the patient's sodium level improved.  On the day of discharge, her 
sodium level is normal.

 

In terms of the healthcare associated pneumonia, the patient was treated with 
multiple antibiotics, but most recently has completed a course of treatment 
with Augmentin and Doxycycline.  The patient states at this point her breathing 
is essentially back to normal.  She was never febrile, so the diagnosis of 
healthcare associated pneumonia is not completely clear.

 

The patient met with Dr. Kaye from [facility] on 02/13/2019 to discuss 
her prognosis and options.  The patient at this point has decided that she 
wished to be signed on to hospice.  The hospice residence has a bed for the 
patient, but they are not able to take her until 02/18/2019 and therefore the 
patient is being discharged back to [facility] today, 02/15/2019, to then 
subsequently be transferred to the [facility] residence on 02/18/2019.  The 
patient understands that she will no longer receive blood transfusions.  She is 
accepting of this. Sublingual Morphine and Ativan have been ordered for her.

 

FOLLOW-UP CONCERNS:  The patient is being discharged to [facility] for 
comfort measures only today, 02/15/2019.  The plan is for transfer to the 
[facility] residence on 02/18/2019.  If the patient were to begin to 
decompensate prior to her transfer to the [facility], the goal would 
be to manage the patient's symptoms at [facility] without transferring back 
to the hospital.

 

ACTIVITY:  As tolerated.

 

DIET:  Regular as tolerated.

 

CONDITION ON DISCHARGE:  Guarded.

 

TIME SPENT:  Thirty-five minutes were spent discharging this patient.

 

 [id number]/[facility] [place] #: [id number]

BRITNEY